# Patient Record
Sex: FEMALE | Race: WHITE | Employment: OTHER | URBAN - METROPOLITAN AREA
[De-identification: names, ages, dates, MRNs, and addresses within clinical notes are randomized per-mention and may not be internally consistent; named-entity substitution may affect disease eponyms.]

---

## 2018-03-05 PROBLEM — E87.20 LACTIC ACIDOSIS: Status: ACTIVE | Noted: 2018-03-05

## 2018-03-05 PROBLEM — D72.829 LEUKOCYTOSIS: Status: ACTIVE | Noted: 2018-03-05

## 2018-03-05 PROBLEM — R18.8 ASCITES: Status: ACTIVE | Noted: 2018-03-05

## 2018-03-05 PROBLEM — K63.89 MESENTERIC MASS: Status: ACTIVE | Noted: 2018-03-05

## 2018-03-09 PROBLEM — C56.2 OVARIAN CANCER, LEFT (HCC): Status: ACTIVE | Noted: 2018-03-09

## 2018-08-06 ENCOUNTER — OFFICE VISIT (OUTPATIENT)
Dept: FAMILY MEDICINE CLINIC | Age: 58
End: 2018-08-06

## 2018-08-06 VITALS
HEART RATE: 123 BPM | BODY MASS INDEX: 19.61 KG/M2 | TEMPERATURE: 99 F | DIASTOLIC BLOOD PRESSURE: 74 MMHG | SYSTOLIC BLOOD PRESSURE: 115 MMHG | RESPIRATION RATE: 18 BRPM | HEIGHT: 66 IN | OXYGEN SATURATION: 96 % | WEIGHT: 122 LBS

## 2018-08-06 DIAGNOSIS — C56.2 OVARIAN CANCER, LEFT (HCC): ICD-10-CM

## 2018-08-06 DIAGNOSIS — R00.0 TACHYCARDIA: ICD-10-CM

## 2018-08-06 DIAGNOSIS — F79 MENTAL RETARDATION: Primary | ICD-10-CM

## 2018-08-06 DIAGNOSIS — Z12.39 SCREENING FOR BREAST CANCER: ICD-10-CM

## 2018-08-06 PROBLEM — C80.1 CANCER (HCC): Status: ACTIVE | Noted: 2018-01-01

## 2018-08-06 RX ORDER — METOCLOPRAMIDE 10 MG/1
10 TABLET ORAL
COMMUNITY
End: 2020-09-24

## 2018-08-06 RX ORDER — DEXAMETHASONE 4 MG/1
TABLET ORAL EVERY 12 HOURS
COMMUNITY
End: 2019-10-30 | Stop reason: ALTCHOICE

## 2018-08-06 RX ORDER — ONDANSETRON HYDROCHLORIDE 8 MG/1
8 TABLET, FILM COATED ORAL
COMMUNITY
End: 2019-10-30 | Stop reason: ALTCHOICE

## 2018-08-06 RX ORDER — LORAZEPAM 1 MG/1
TABLET ORAL
Status: ON HOLD | COMMUNITY
End: 2021-07-04 | Stop reason: SDUPTHER

## 2018-08-06 NOTE — PROGRESS NOTES
[address]       Date of Service:  2018   Patient's Name: Landry So   Patient's :  1960     Subjective Hx:       No chief complaint on file. Landry So is a 62 y.o. female with a history of  has a past medical history of Cancer ([facility] [address] (2018) and Mental retardation. She present today with her her brother who is her care giver to establish care. Has a surgical history of Total abdominal Hysterectomy with bilateral Oophorectomy this past March due to stage III Ovarian Cancer. Started Chemotherapy in April and receiving it once a month. [facility] and bother was asked find a provider that she can see regularly. Will request records. Was recently approved for Medicaid and will be having 18 hours of week oif nursing care as her hygeine is very poor according to her brother. Also she will be going to Adult Day Care twice a week. Brother brought in a form for Physical to be filled. CareEverywhere updated. Hospital encounter notes, testing and discharge summary reviewed. PMHx and Problem list were reviewed and annotated with pertinent information. Medication list updated as below. Past Medical History:   Diagnosis Date    Cancer [facility]) 2018    ovarian    Mental retardation      Active Problem List:      Patient Active Problem List   Diagnosis Code    Lactic acidosis E87.2    Leukocytosis D72.829    Ascites R18.8    Mesenteric mass K63.9    Ovarian cancer, left (HCC) C56.2    Cancer (HCC) C80.1    Mental retardation F79     Medications:     Current Outpatient Prescriptions   Medication Sig    ondansetron hcl (ZOFRAN) 8 mg tablet Take 8 mg by mouth every eight (8) hours as needed for Nausea.  dexamethasone (DECADRON) 4 mg tablet Take  by mouth every twelve (12) hours.  metoclopramide HCl (REGLAN) 10 mg tablet Take 10 mg by mouth Before breakfast, lunch, dinner and at bedtime.     LORazepam (ATIVAN) 1 mg tablet Take  by mouth every four (4) hours as needed for Anxiety.  HYDROcodone-acetaminophen (NORCO) 5-325 mg per tablet Take 1 Tab by mouth every four (4) hours as needed. Max Daily Amount: 6 Tabs.  magnesium hydroxide (MILK OF MAGNESIA) 400 mg/5 mL suspension Take 30 mL by mouth as needed for Constipation.  enoxaparin (LOVENOX) 40 mg/0.4 mL 0.4 mL by SubCUTAneous route every twenty-four (24) hours. Indications: DEEP VEIN THROMBOSIS PREVENTION    diazePAM (VALIUM) 5 mg tablet Take 1 Tab by mouth every eight (8) hours as needed for Anxiety. Max Daily Amount: 15 mg. No current facility-administered medications for this visit.         Additional History     Past Surgical History:   Procedure Laterality Date    HX GYN  2018    hysterectomy    HX OTHER SURGICAL      REMOVAL OF MOLE     Social History     Social History    Marital status: SINGLE     Spouse name: N/A    Number of children: N/A    Years of education: N/A     Social History Main Topics    Smoking status: Never Smoker    Smokeless tobacco: Never Used    Alcohol use No    Drug use: No    Sexual activity: Not Asked     Other Topics Concern    None     Social History Narrative     Family History   Problem Relation Age of Onset    Cancer Mother     Cancer Father      No Known Allergies           Review of Systems (positives in bold)  General:   fevers, chills, generalized weakness, fatigue, malaise, weight change, night sweats, decreased appetite  Neurologic: dizziness, lightheadedness, headaches, loss of consciousness, numbness, tingling, focal weakness, speech change,confusion, memory loss, gait or balance difficulty     Neck:  pain, stiffness  Respiratory: dyspnea at rest, dyspnea on exertion, wheezing, cough, sputum production, pain with deep breaths/inspiration, hemoptysis  Cardiovasc:   chest pain, palpitations, orthopnea, PND, pedal edema  Gastrointest:  Intermittent nausea and abdominal pain,  Vomiting, constipation, diarrhea, heart burn, bitter taste in back of throat, bloody stools, tarry black stools    Urinary: dysuria, hematuria, urinary frequency, nocturia, malodorous urine, difficulty initiating flow, slow urine stream  Musculoskel:  joint pain, joint stiffness, joint swelling, trauma, back pain, neck pain, decreased range of motion, focal muscle pain, diffuse myalgias   Psychiatric: No agitation, confusion/disorientation, suicidal or homicidal ideation. Endocrine: polydipsia, polyuria, polyphagia, cold intolerance, heat intolerance  Hematologic: easy bruising, easy bleeding  Dermatologic: Itching, rashes    Physical Assessment:   VS:    Visit Vitals    /74 (BP 1 Location: Left arm, BP Patient Position: Sitting)    Pulse (!) 123    Temp 99 °F (37.2 °C) (Oral)    Resp 18    Ht 5' 6\" (1.676 m)    Wt 122 lb (55.3 kg)    SpO2 96%    BMI 19.69 kg/m2         General:   Well-groomed, well-nourished, in no distress, pleasant, alert, appropriate and conversant. Neck:     Neck supple, no swelling, mass or tenderness or thyromegaly; trachea midline. Cardiovasc: Tachycardia, No JVD. RRR,  no murmur, rubs or gallops. Pulmonary:   Normal respiratory effort. Lungs clear bilaterally, good air movement, no wheezing, rales or rhonchi. Abdomen:   Abdomen soft, normal bowel sounds. No masses, tenderness, rebound/rigidity or CVA tenderness. No hepatosplenomegaly. Extremities:  No redness, deformity, edema, tenderness on palpation,  LEs warm and well-perfused. Neuro:           Impaired gait. Skin:    No rashe or jaundice  MSK:   Normal full range of motion of joints, 5/5 muscle strength throughout. Psych:  Alert and oriented, no focal deficits. No facial asymmetry noted. No pressured speech or abnormal thought content   Psych:  Affect and behavior are normal and appropriate    Thought process demonstrated is linear and logical    Assessment/Plan:            ICD-10-CM ICD-9-CM    1. Mental retardation F79 319    2.  Ovarian cancer, left (HCC) C56.2 183.0    3. Tachycardia R00.0 785.0 LIPID PANEL      TSH 3RD GENERATION     Sent request for medical record release to [facility]. Awaiting record. The patient states understanding and agrees with the treatment plan. All questions were answered. Follow-up Disposition:  Return in about 4 weeks (around 9/3/2018) for heart rate.   JOHN Crowley  8/6/2018, 9:28 AM

## 2018-08-06 NOTE — PROGRESS NOTES
Rosie Head is a 62 y.o. female here this morning to establish care.  Her Oncologist, Dr. Duran Bergman, would like patient to have a mammogram.

## 2018-08-06 NOTE — PATIENT INSTRUCTIONS
Cardiac Arrhythmia: Care Instructions  Your Care Instructions    A cardiac arrhythmia is a change in the normal rhythm of the heart. Your heart may beat too fast or too slow or beat with an irregular or skipping rhythm. A change in the heart's rhythm may feel like a really strong heartbeat or a fluttering in your chest. A severe heart rhythm problem can keep the body from getting the blood it needs. This can result in shortness of breath, lightheadedness, and fainting. You may take medicine to treat your condition. Your doctor may recommend a pacemaker or recommend catheter ablation to destroy small parts of the heart that are causing a rhythm problem. Another possible treatment is an implantable cardioverter-defibrillator (ICD). An ICD is a device that gives the heart a shock to return the heart to a normal rhythm. Follow-up care is a key part of your treatment and safety. Be sure to make and go to all appointments, and call your doctor if you are having problems. It's also a good idea to know your test results and keep a list of the medicines you take. How can you care for yourself at home?  [facility]    · Be safe with medicines. Take your medicines exactly as prescribed. Call your doctor if you think you are having a problem with your medicine. You will get more details on the specific medicines your doctor prescribes.     · If you received a pacemaker or an ICD, you will get a fact sheet about it.     · Wear medical alert jewelry that says you have an abnormal heart rhythm. You can buy this at most drugstores.    Lifestyle changes    · Eat a heart-healthy diet.     · Stay at a healthy weight. Lose weight if you need to.     · Avoid nicotine, too much alcohol, and illegal drugs (meth, speed, and cocaine). Also, get enough sleep and do not overeat.     · Ask your doctor whether you can take over-the-counter medicines (such as decongestants).  These can make your heart beat fast.     · Talk to your doctor about any limits to activities, such as driving, or tasks where you use power tools or ladders. Activity    · Start light exercise if your doctor says you can. Even a small amount will help you get stronger, have more energy, and manage your stress.     · Get regular exercise. Try for 30 minutes on most days of the week. Ask your doctor what level of exercise is safe for you. If activity is not likely to cause health problems, you probably do not have limits on the type or level of activity that you can do. You may want to walk, swim, bike, or do other activities.     · When you exercise, watch for signs that your heart is working too hard. You are pushing too hard if you cannot talk while you exercise. If you become short of breath or dizzy or have chest pain, sit down and rest.     · Check your pulse daily. Place two fingers on the artery at the palm side of your wrist, in line with your thumb. If your heartbeat seems uneven, talk to your doctor. When should you call for help? Call 911 anytime you think you may need emergency care. For example, call if:    · You have symptoms of sudden heart failure. These may include:  ¨ Severe trouble breathing. ¨ A fast or irregular heartbeat. ¨ Coughing up pink, foamy mucus. ¨ You passed out.     · You have signs of a stroke. These include:  ¨ Sudden numbness, paralysis, or weakness in your face, arm, or leg, especially on only one side of your body. ¨ New problems with walking or balance. ¨ Sudden vision changes. ¨ Drooling or slurred speech. ¨ New problems speaking or understanding simple statements, or feeling confused. ¨ A sudden, severe headache that is different from past headaches.    Call your doctor now or seek immediate medical care if:    · You have new or changed symptoms of heart failure, such as:  ¨ New or increased shortness of breath. ¨ New or worse swelling in your legs, ankles, or feet.   ¨ Sudden weight gain, such as more than 2 to 3 pounds in a day or 5 pounds in a week. (Your doctor may suggest a different range of weight gain.)  ¨ Feeling dizzy or lightheaded or like you may faint. ¨ Feeling so tired or weak that you cannot do your usual activities. ¨ Not sleeping well. Shortness of breath wakes you at night. You need extra pillows to prop yourself up to breathe easier.    Watch closely for changes in your health, and be sure to contact your doctor if:    · You do not get better as expected. Where can you learn more? Go to http://jose-diana.info/. Enter L768 in the search box to learn more about \"Cardiac Arrhythmia: Care Instructions. \"  Current as of: December 6, 2017  Content Version: 11.7  © 1111-5737 [employer]. Care instructions adapted under license by [employer] (which disclaims liability or warranty for this information). If you have questions about a medical condition or this instruction, always ask your healthcare professional. [address] any warranty or liability for your use of this information.

## 2018-09-04 ENCOUNTER — OFFICE VISIT (OUTPATIENT)
Dept: FAMILY MEDICINE CLINIC | Age: 58
End: 2018-09-04

## 2018-09-04 VITALS
HEIGHT: 66 IN | SYSTOLIC BLOOD PRESSURE: 130 MMHG | RESPIRATION RATE: 18 BRPM | WEIGHT: 119 LBS | OXYGEN SATURATION: 97 % | HEART RATE: 126 BPM | DIASTOLIC BLOOD PRESSURE: 88 MMHG | TEMPERATURE: 98.2 F | BODY MASS INDEX: 19.13 KG/M2

## 2018-09-04 DIAGNOSIS — F79 MENTAL RETARDATION: ICD-10-CM

## 2018-09-04 DIAGNOSIS — Z11.1 ENCOUNTER FOR SCREENING FOR RESPIRATORY TUBERCULOSIS: ICD-10-CM

## 2018-09-04 DIAGNOSIS — R00.0 TACHYCARDIA: Primary | ICD-10-CM

## 2018-09-04 NOTE — PATIENT INSTRUCTIONS
Cardiac Arrhythmia: Care Instructions  Your Care Instructions    A cardiac arrhythmia is a change in the normal rhythm of the heart. Your heart may beat too fast or too slow or beat with an irregular or skipping rhythm. A change in the heart's rhythm may feel like a really strong heartbeat or a fluttering in your chest. A severe heart rhythm problem can keep the body from getting the blood it needs. This can result in shortness of breath, lightheadedness, and fainting. You may take medicine to treat your condition. Your doctor may recommend a pacemaker or recommend catheter ablation to destroy small parts of the heart that are causing a rhythm problem. Another possible treatment is an implantable cardioverter-defibrillator (ICD). An ICD is a device that gives the heart a shock to return the heart to a normal rhythm. Follow-up care is a key part of your treatment and safety. Be sure to make and go to all appointments, and call your doctor if you are having problems. It's also a good idea to know your test results and keep a list of the medicines you take. How can you care for yourself at home?  [facility]    · Be safe with medicines. Take your medicines exactly as prescribed. Call your doctor if you think you are having a problem with your medicine. You will get more details on the specific medicines your doctor prescribes.     · If you received a pacemaker or an ICD, you will get a fact sheet about it.     · Wear medical alert jewelry that says you have an abnormal heart rhythm. You can buy this at most drugstores.    Lifestyle changes    · Eat a heart-healthy diet.     · Stay at a healthy weight. Lose weight if you need to.     · Avoid nicotine, too much alcohol, and illegal drugs (meth, speed, and cocaine). Also, get enough sleep and do not overeat.     · Ask your doctor whether you can take over-the-counter medicines (such as decongestants).  These can make your heart beat fast.     · Talk to your doctor about any limits to activities, such as driving, or tasks where you use power tools or ladders. Activity    · Start light exercise if your doctor says you can. Even a small amount will help you get stronger, have more energy, and manage your stress.     · Get regular exercise. Try for 30 minutes on most days of the week. Ask your doctor what level of exercise is safe for you. If activity is not likely to cause health problems, you probably do not have limits on the type or level of activity that you can do. You may want to walk, swim, bike, or do other activities.     · When you exercise, watch for signs that your heart is working too hard. You are pushing too hard if you cannot talk while you exercise. If you become short of breath or dizzy or have chest pain, sit down and rest.     · Check your pulse daily. Place two fingers on the artery at the palm side of your wrist, in line with your thumb. If your heartbeat seems uneven, talk to your doctor. When should you call for help? Call 911 anytime you think you may need emergency care. For example, call if:    · You have symptoms of sudden heart failure. These may include:  ¨ Severe trouble breathing. ¨ A fast or irregular heartbeat. ¨ Coughing up pink, foamy mucus. ¨ You passed out.     · You have signs of a stroke. These include:  ¨ Sudden numbness, paralysis, or weakness in your face, arm, or leg, especially on only one side of your body. ¨ New problems with walking or balance. ¨ Sudden vision changes. ¨ Drooling or slurred speech. ¨ New problems speaking or understanding simple statements, or feeling confused. ¨ A sudden, severe headache that is different from past headaches.    Call your doctor now or seek immediate medical care if:    · You have new or changed symptoms of heart failure, such as:  ¨ New or increased shortness of breath. ¨ New or worse swelling in your legs, ankles, or feet.   ¨ Sudden weight gain, such as more than 2 to 3 pounds in a day or 5 pounds in a week. (Your doctor may suggest a different range of weight gain.)  ¨ Feeling dizzy or lightheaded or like you may faint. ¨ Feeling so tired or weak that you cannot do your usual activities. ¨ Not sleeping well. Shortness of breath wakes you at night. You need extra pillows to prop yourself up to breathe easier.    Watch closely for changes in your health, and be sure to contact your doctor if:    · You do not get better as expected. Where can you learn more? Go to http://jose-diana.info/. Enter L061 in the search box to learn more about \"Cardiac Arrhythmia: Care Instructions. \"  Current as of: December 6, 2017  Content Version: 11.7  © 4224-7385 [employer]. Care instructions adapted under license by [employer] (which disclaims liability or warranty for this information). If you have questions about a medical condition or this instruction, always ask your healthcare professional. [address] any warranty or liability for your use of this information.

## 2018-09-05 NOTE — PROGRESS NOTES
Progress Note  Today's Date:  2018   Patient:  Sofia Frias  Patient :  1960    Subjective:     Chief Complaint   Patient presents with   James Goff is a 62 y.o. female who presents for follow up for elevated heart rate. She was seen here a month ago and her heart rate was found to be elevated. Advised care giver who is her brother to check it at home, record and bring it at this visit which he did. Record shows heart rate mostly in th 90's with only the first two readings in the 100's and few in the 80's. Patient has been receiving to Chemotherapy with last dose recently according to brother. He will continue to monitor it at home. Brother states that she is usually anxious when she goes to [facility]. Patient denies chest pain. Current Outpatient Meds and Allergies     Current Outpatient Prescriptions on File Prior to Visit   Medication Sig Dispense Refill    ondansetron hcl (ZOFRAN) 8 mg tablet Take 8 mg by mouth every eight (8) hours as needed for Nausea.  dexamethasone (DECADRON) 4 mg tablet Take  by mouth every twelve (12) hours.  metoclopramide HCl (REGLAN) 10 mg tablet Take 10 mg by mouth Before breakfast, lunch, dinner and at bedtime.  LORazepam (ATIVAN) 1 mg tablet Take  by mouth every four (4) hours as needed for Anxiety. No current facility-administered medications on file prior to visit. These medications have been reviewed and reconciled with the patient during today's visit. No Known Allergies    ROS:     Pertinent positive as above in HPI. All others negative. Objective:     VS:    Visit Vitals    /88 (BP 1 Location: Left arm, BP Patient Position: Sitting)    Pulse (!) 126    Temp 98.2 °F (36.8 °C) (Oral)    Resp 18    Ht 5' 6\" (1.676 m)    Wt 119 lb (54 kg)    SpO2 97%    BMI 19.21 kg/m2       General:   Well-nourished, well-groomed, pleasant, alert, in no acute distress.      Cardiovasc: tachycardia, no murmurs, no rubs, no gallops,   Pulmonary:   Clear breath sounds bilaterally, good air movement, no wheezing, no rales, no rhonchi, normal respiratory effort  Abdomen:   Abdomen soft, nontender, nondistended, NABS,   Neuro:   Alert, minimally conversant, appropriate, following commands, no focal deficits. Psychiatric: Oriented to time, place and person. Normal mood, no agitation or anxiety. Normal affect. Pleasant and cooperative. Hospital Outpatient Visit on 08/27/2018   Component Date Value Ref Range Status    WBC 08/27/2018 10.0  4.0 - 11.0 1000/mm3 Final    RBC 08/27/2018 3.44* 3.60 - 5.20 M/uL Final    HGB 08/27/2018 11.2* 13.0 - 17.2 gm/dl Final    HCT 08/27/2018 34.6* 37.0 - 50.0 % Final    MCV 08/27/2018 100.6* 80.0 - 98.0 fL Final    MCH 08/27/2018 32.6  25.4 - 34.6 pg Final    MCHC 08/27/2018 32.4  30.0 - 36.0 gm/dl Final    PLATELET 88/64/8648 980  140 - 450 1000/mm3 Final    MPV 08/27/2018 9.8  6.0 - 10.0 fL Final    RDW-SD 08/27/2018 53.3* 36.4 - 46.3   Final    Sodium 08/27/2018 142  136 - 145 mEq/L Final    Potassium 08/27/2018 4.0  3.5 - 5.1 mEq/L Final    Chloride 08/27/2018 104  98 - 107 mEq/L Final    CO2 08/27/2018 30  21 - 32 mEq/L Final    Glucose 08/27/2018 136* 74 - 106 mg/dl Final    BUN 08/27/2018 22  7 - 25 mg/dl Final    Creatinine 08/27/2018 0.7  0.6 - 1.3 mg/dl Final    GFR est AA 08/27/2018 >60.0    Final    Comment: THE [facility] STATES THAT THE MDRD STUDY EQUATION SHOULD ONLY BE USED ON  INDIVIDUALS 18 OR OLDER. THE REPORT ALSO NOTES THAT THE MDRD STUDY EQUATION HAS NOT BEEN  VALIDATED FOR USE WITH THE ELDERLY (OVER 79YEARS OF AGE), PREGNANT WOMEN, PATIENTS WITH SERIOUS  COMORBID CONDITIONS, OR PERSONS WITH EXTREMES OF BODY SIZE, MUSCLE MASS, OR NUTRITIONAL STATUS. APPLICATION OF THE EQUATION TO THESE PATIENT GROUPS MAY LEAD TO ERRORS IN GFR ESTIMATION.  GFR  ESTIMATING EQUATIONS HAVE POORER AGREEMENT WITH MEASURED GFR FOR ILL HOSPITALIZED PATIENTS AND  FOR PEOPLE WITH NEAR NORMAL KIDNEY FUNCTION THAN FOR SUBJECTS IN THE MDRD STUDY. VALIDATION  STUDIES ARE IN PROGRESS TO EVALUATE THE MDRD STUDY EQUATION FOR ADDITIONAL ETHNIC GROUPS, THE  ELDERLY, VARIOUS DISEASE CONDITIONS, AND PEOPLE WITH NORMAL KIDNEY FUNCTION. GFRA----REFERS TO   GFRO---REFERS TO OTHER RACES  REFERENCES AVAILABLE UPON REQUEST.      GFR est non-AA 08/27/2018 >60    Final    Calcium 08/27/2018 9.6  8.5 - 10.1 mg/dl Final    AST (SGOT) 08/27/2018 14* 15 - 37 U/L Final    ALT (SGPT) 08/27/2018 24  12 - 78 U/L Final    Alk. phosphatase 08/27/2018 90  45 - 117 U/L Final    Bilirubin, total 08/27/2018 0.7  0.2 - 1.0 mg/dl Final    Protein, total 08/27/2018 7.4  6.4 - 8.2 gm/dl Final    Albumin 08/27/2018 3.9  3.4 - 5.0 gm/dl Final    Anion gap 08/27/2018 8  5 - 15 mmol/L Final    Cancer Ag (CA) 125 08/27/2018 6  <35 U/mL Final    Comment: This test was performed using the [employer]  Chemiluminescent method. Values obtained from  different assay methods cannot be used interchangeably.  levels, regardless of value, should not be  interpreted as absolute evidence of the presence or  absence of disease.   Test Performed by Myles Talamantes, [address] M.D., Ph.D., Director of Laboratories  [phone], [address] Outpatient Visit on 08/20/2018   Component Date Value Ref Range Status    WBC 08/20/2018 8.2  4.0 - 11.0 1000/mm3 Final    RBC 08/20/2018 3.37* 3.60 - 5.20 M/uL Final    HGB 08/20/2018 10.9* 13.0 - 17.2 gm/dl Final    HCT 08/20/2018 33.4* 37.0 - 50.0 % Final    MCV 08/20/2018 99.1* 80.0 - 98.0 fL Final    MCH 08/20/2018 32.3  25.4 - 34.6 pg Final    MCHC 08/20/2018 32.6  30.0 - 36.0 gm/dl Final    PLATELET 34/50/9047 326  140 - 450 1000/mm3 Final    MPV 08/20/2018 9.8  6.0 - 10.0 fL Final    RDW-SD 08/20/2018 52.3* 36.4 - 46.3   Final    NRBC 08/20/2018 0  0 - 0   Final    IMMATURE GRANULOCYTES 08/20/2018 1.0  0.0 - 3.0 % Final    Comment: IG - Immature granulocytes (promyelocytes + myelocytes + metamyelocytes), their presence  indicates a left shift. An IG > 3% may predict positive blood cultures with 98% specificity.  (P<0.04) and 92% Positive Predictive Value (Lana)1. Increased immature granulocytes  assist with the detection of infection and/or inflammation and may be present at an early stage  and are more sensitive and specific than band counts.  NEUTROPHILS 08/20/2018 68.0* 34 - 64 % Final    LYMPHOCYTES 08/20/2018 19.7* 28 - 48 % Final    MONOCYTES 08/20/2018 9.5  1 - 13 % Final    EOSINOPHILS 08/20/2018 1.1  0 - 5 % Final    BASOPHILS 08/20/2018 0.7  0 - 3 % Final   Hospital Outpatient Visit on 08/13/2018   Component Date Value Ref Range Status    WBC 08/13/2018 13.2* 4.0 - 11.0 1000/mm3 Final    NEUTROPHILS 08/13/2018 85.4* 34 - 64 % Final    LYMPHOCYTES 08/13/2018 9.7* 28 - 48 % Final    RBC 08/13/2018 3.49* 3.60 - 5.20 M/uL Final    MONOCYTES 08/13/2018 4.9  1 - 13 % Final    HGB 08/13/2018 11.2* 13.0 - 17.2 gm/dl Final    HCT 08/13/2018 34.4* 37.0 - 50.0 % Final    MCV 08/13/2018 98.6* 80.0 - 98.0 fL Final    MCH 08/13/2018 32.1  25.4 - 34.6 pg Final    MCHC 08/13/2018 32.6  30.0 - 36.0 gm/dl Final    PLATELET 37/14/5547 201  140 - 450 1000/mm3 Final    MPV 08/13/2018 10.6* 6.0 - 10.0 fL Final    RDW-SD 08/13/2018 52.7* 36.4 - 46.3   Final    NRBC 08/13/2018 0  0 - 0   Final    IMMATURE GRANULOCYTES 08/13/2018 7.0* 0.0 - 3.0 % Final    Comment: IG - Immature granulocytes (promyelocytes + myelocytes + metamyelocytes), their presence  indicates a left shift. An IG > 3% may predict positive blood cultures with 98% specificity.  (P<0.04) and 92% Positive Predictive Value (Lana)1.  Increased immature granulocytes  assist with the detection of infection and/or inflammation and may be present at an early stage  and are more sensitive and specific than band counts.  EOSINOPHILS 08/13/2018 0.5  0 - 5 % Final    BASOPHILS 08/13/2018 0.4  0 - 3 % Final    Smudge cells 08/13/2018 9.7    Final    Giant platelets 98/87/2877 2.9    Final   Hospital Outpatient Visit on 08/06/2018   Component Date Value Ref Range Status    WBC 08/06/2018 53.1* 4.0 - 11.0 1000/mm3 Final    RBC 08/06/2018 3.93  3.60 - 5.20 M/uL Final    HGB 08/06/2018 12.7* 13.0 - 17.2 gm/dl Final    HCT 08/06/2018 39.0  37.0 - 50.0 % Final    MCV 08/06/2018 99.2* 80.0 - 98.0 fL Final    BAND NEUTROPHILS 08/06/2018 1.9  0 - 11 % Final    Comment: ** ** ** ** **  CORRECTED REPORT ** ** ** ** **  The previous value of no value was corrected by [id number] on 08/06/18 22:51.      MCH 08/06/2018 32.3  25.4 - 34.6 pg Final    MCHC 08/06/2018 32.6  30.0 - 36.0 gm/dl Final    PLATELET 63/38/6344 608  140 - 450 1000/mm3 Final    MPV 08/06/2018 10.8* 6.0 - 10.0 fL Final    RDW-SD 08/06/2018 57.7* 36.4 - 46.3   Final    Poikilocytosis 08/06/2018 2    Final    Comment: ** ** ** ** **  CORRECTED REPORT ** ** ** ** **  The previous value of no value was corrected by [id number] on 08/06/18 22:51.      NRBC 08/06/2018 0  0 - 0   Final    Anisocytosis 08/06/2018 0    Final    Comment: ** ** ** ** **  CORRECTED REPORT ** ** ** ** **  The previous value of no value was corrected by [id number] on 08/06/18 22:51.      IMMATURE GRANULOCYTES 08/06/2018 17.2* 0.0 - 3.0 % Final    Comment: IG - Immature granulocytes (promyelocytes + myelocytes + metamyelocytes), their presence  indicates a left shift. An IG > 3% may predict positive blood cultures with 98% specificity.  (P<0.04) and 92% Positive Predictive Value (Lana)1. Increased immature granulocytes  assist with the detection of infection and/or inflammation and may be present at an early stage  and are more sensitive and specific than band counts.       HYPOCHROMASIA 08/06/2018 0    Final    Comment: ** ** ** ** **  CORRECTED REPORT ** ** ** ** **  The previous value of no value was corrected by [id number] on 08/06/18 22:51.  Polychromasia 08/06/2018 0    Final    Comment: ** ** ** ** **  CORRECTED REPORT ** ** ** ** **  The previous value of no value was corrected by [id number] on 08/06/18 22:51.  Microcytes 08/06/2018 0    Final    Comment: ** ** ** ** **  CORRECTED REPORT ** ** ** ** **  The previous value of no value was corrected by [id number] on 08/06/18 22:51.  Macrocytes 08/06/2018 0    Final    Comment: ** ** ** ** **  CORRECTED REPORT ** ** ** ** **  The previous value of no value was corrected by [id number] on 08/06/18 22:51.      NEUTROPHILS 08/06/2018 92.5* 34 - 64 % Corrected    Comment: ** ** ** ** **  CORRECTED REPORT ** ** ** ** **  The previous value of 79.1 was corrected by [id number] on 08/06/18 22:59.      LYMPHOCYTES 08/06/2018 2.8* 28 - 48 % Corrected    Comment: ** ** ** ** **  CORRECTED REPORT ** ** ** ** **  The previous value of 2.9 was corrected by [id number] on 08/06/18 22:59.      MONOCYTES 08/06/2018 2.8  1 - 13 % Corrected    Comment: ** ** ** ** **  CORRECTED REPORT ** ** ** ** **  The previous value of 0.7 was corrected by [id number] on 08/06/18 22:59.      EOSINOPHILS 08/06/2018 0.0  0 - 5 % Final    BASOPHILS 08/06/2018 0.0  0 - 3 % Corrected    Comment: ** ** ** ** **  CORRECTED REPORT ** ** ** ** **  The previous value of 0.1 was corrected by [id number] on 08/06/18 22:59.       Sodium 08/06/2018 136  136 - 145 mEq/L Final    Potassium 08/06/2018 4.1  3.5 - 5.1 mEq/L Final    Chloride 08/06/2018 99  98 - 107 mEq/L Final    CO2 08/06/2018 28  21 - 32 mEq/L Final    Glucose 08/06/2018 73* 74 - 106 mg/dl Final    BUN 08/06/2018 18  7 - 25 mg/dl Final    Creatinine 08/06/2018 0.6  0.6 - 1.3 mg/dl Final    GFR est AA 08/06/2018 >60.0    Final    Comment: THE [facility] STATES THAT THE MDRD STUDY EQUATION SHOULD ONLY BE USED ON  INDIVIDUALS 18 OR OLDER. THE REPORT ALSO NOTES THAT THE MDRD STUDY EQUATION HAS NOT BEEN  VALIDATED FOR USE WITH THE ELDERLY (OVER 79YEARS OF AGE), PREGNANT WOMEN, PATIENTS WITH SERIOUS  COMORBID CONDITIONS, OR PERSONS WITH EXTREMES OF BODY SIZE, MUSCLE MASS, OR NUTRITIONAL STATUS. APPLICATION OF THE EQUATION TO THESE PATIENT GROUPS MAY LEAD TO ERRORS IN GFR ESTIMATION. GFR  ESTIMATING EQUATIONS HAVE POORER AGREEMENT WITH MEASURED GFR FOR ILL HOSPITALIZED PATIENTS AND  FOR PEOPLE WITH NEAR NORMAL KIDNEY FUNCTION THAN FOR SUBJECTS IN THE MDRD STUDY. VALIDATION  STUDIES ARE IN PROGRESS TO EVALUATE THE MDRD STUDY EQUATION FOR ADDITIONAL ETHNIC GROUPS, THE  ELDERLY, VARIOUS DISEASE CONDITIONS, AND PEOPLE WITH NORMAL KIDNEY FUNCTION. GFRA----REFERS TO   GFRO---REFERS TO OTHER RACES  REFERENCES AVAILABLE UPON REQUEST.      GFR est non-AA 08/06/2018 >60    Final    Calcium 08/06/2018 9.1  8.5 - 10.1 mg/dl Final    AST (SGOT) 08/06/2018 19  15 - 37 U/L Final    ALT (SGPT) 08/06/2018 28  12 - 78 U/L Final    Alk. phosphatase 08/06/2018 109  45 - 117 U/L Final    Bilirubin, total 08/06/2018 0.8  0.2 - 1.0 mg/dl Final    Protein, total 08/06/2018 7.6  6.4 - 8.2 gm/dl Final    Albumin 08/06/2018 4.1  3.4 - 5.0 gm/dl Final    Anion gap 08/06/2018 9  5 - 15 mmol/L Final    Cancer Ag (CA) 125 08/06/2018 6  <35 U/mL Final    Comment: This test was performed using the [employer]  Chemiluminescent method. Values obtained from  different assay methods cannot be used interchangeably.  levels, regardless of value, should not be  interpreted as absolute evidence of the presence or  absence of disease.   Test Performed by Indira Huntley, [address] M.D., Ph.D., Director of Laboratories  [phone], CLIA [id number]      Cholesterol, total 08/06/2018 131* 140 - 199 mg/dl Final    HDL Cholesterol 08/06/2018 41  40 - 96 mg/dl Final    Triglyceride 08/06/2018 243* 29 - 150 mg/dl Final    LDL, calculated 08/06/2018 41  0 - 130 mg/dl Final    Comment: TARGET/DECISION VALUES DEPEND ON A NUMBER OF RISK FACTORS. LDL CALCULATION NOT VALID IF TRIGLYCERIDES ARE GREATER THAN 400 mg/dL.  CHOL/HDL Ratio 08/06/2018 3.2  0.0 - 4.4 Ratio Final    TSH 08/06/2018 4.820* 0.358 - 3.740 uIU/mL Final   Hospital Outpatient Visit on 07/30/2018   Component Date Value Ref Range Status    WBC 07/30/2018 7.5  4.0 - 11.0 1000/mm3 Final    RBC 07/30/2018 3.72  3.60 - 5.20 M/uL Final    HGB 07/30/2018 11.7* 13.0 - 17.2 gm/dl Final    HCT 07/30/2018 36.1* 37.0 - 50.0 % Final    MCV 07/30/2018 97.0  80.0 - 98.0 fL Final    MCH 07/30/2018 31.5  25.4 - 34.6 pg Final    MCHC 07/30/2018 32.4  30.0 - 36.0 gm/dl Final    PLATELET 23/46/6295 854* 140 - 450 1000/mm3 Final    MPV 07/30/2018 9.8  6.0 - 10.0 fL Final    RDW-SD 07/30/2018 60.6* 36.4 - 46.3   Final    NRBC 07/30/2018 0  0 - 0   Final    IMMATURE GRANULOCYTES 07/30/2018 0.3  0.0 - 3.0 % Final    Comment: IG - Immature granulocytes (promyelocytes + myelocytes + metamyelocytes), their presence  indicates a left shift. An IG > 3% may predict positive blood cultures with 98% specificity.  (P<0.04) and 92% Positive Predictive Value ([place])1. Increased immature granulocytes  assist with the detection of infection and/or inflammation and may be present at an early stage  and are more sensitive and specific than band counts.       NEUTROPHILS 07/30/2018 63.3  34 - 64 % Final    LYMPHOCYTES 07/30/2018 23.5* 28 - 48 % Final    MONOCYTES 07/30/2018 10.1  1 - 13 % Final    EOSINOPHILS 07/30/2018 2.1  0 - 5 % Final    BASOPHILS 07/30/2018 0.7  0 - 3 % Final    Sodium 07/30/2018 140  136 - 145 mEq/L Final    Potassium 07/30/2018 3.9  3.5 - 5.1 mEq/L Final    Chloride 07/30/2018 105  98 - 107 mEq/L Final    CO2 07/30/2018 30  21 - 32 mEq/L Final    Glucose 07/30/2018 89  74 - 106 mg/dl Final    BUN 07/30/2018 22  7 - 25 mg/dl Final    Creatinine 07/30/2018 0.5* 0.6 - 1.3 mg/dl Final    GFR est AA 07/30/2018 >60.0    Final    Comment: THE [facility] GROUP STATES THAT THE MDRD STUDY EQUATION SHOULD ONLY BE USED ON  INDIVIDUALS 18 OR OLDER. THE REPORT ALSO NOTES THAT THE MDRD STUDY EQUATION HAS NOT BEEN  VALIDATED FOR USE WITH THE ELDERLY (OVER 79YEARS OF AGE), PREGNANT WOMEN, PATIENTS WITH SERIOUS  COMORBID CONDITIONS, OR PERSONS WITH EXTREMES OF BODY SIZE, MUSCLE MASS, OR NUTRITIONAL STATUS. APPLICATION OF THE EQUATION TO THESE PATIENT GROUPS MAY LEAD TO ERRORS IN GFR ESTIMATION. GFR  ESTIMATING EQUATIONS HAVE POORER AGREEMENT WITH MEASURED GFR FOR ILL HOSPITALIZED PATIENTS AND  FOR PEOPLE WITH NEAR NORMAL KIDNEY FUNCTION THAN FOR SUBJECTS IN THE MDRD STUDY. VALIDATION  STUDIES ARE IN PROGRESS TO EVALUATE THE MDRD STUDY EQUATION FOR ADDITIONAL ETHNIC GROUPS, THE  ELDERLY, VARIOUS DISEASE CONDITIONS, AND PEOPLE WITH NORMAL KIDNEY FUNCTION. GFRA----REFERS TO   GFRO---REFERS TO OTHER RACES  REFERENCES AVAILABLE UPON REQUEST.      GFR est non-AA 07/30/2018 >60    Final    Calcium 07/30/2018 9.4  8.5 - 10.1 mg/dl Final    AST (SGOT) 07/30/2018 11* 15 - 37 U/L Final    ALT (SGPT) 07/30/2018 23  12 - 78 U/L Final    Alk. phosphatase 07/30/2018 89  45 - 117 U/L Final    Bilirubin, total 07/30/2018 0.4  0.2 - 1.0 mg/dl Final    Protein, total 07/30/2018 7.6  6.4 - 8.2 gm/dl Final    Albumin 07/30/2018 4.1  3.4 - 5.0 gm/dl Final    Anion gap 07/30/2018 5  5 - 15 mmol/L Final    Cancer Ag (CA) 125 07/30/2018 6  <35 U/mL Final    Comment: This test was performed using the [employer]  Chemiluminescent method. Values obtained from  different assay methods cannot be used interchangeably.  levels, regardless of value, should not be  interpreted as absolute evidence of the presence or  absence of disease.   Test Performed by Sushma Raines [place] [id number],  [facility] [address] [place] [address] Haleigh Musa M.D., Ph.D., Director of Laboratories  [phone], [address] Outpatient Visit on 07/23/2018   Component Date Value Ref Range Status    WBC 07/23/2018 8.3  4.0 - 11.0 1000/mm3 Final    RBC 07/23/2018 3.53* 3.60 - 5.20 M/uL Final    HGB 07/23/2018 11.1* 13.0 - 17.2 gm/dl Final    HCT 07/23/2018 33.4* 37.0 - 50.0 % Final    MCV 07/23/2018 94.6  80.0 - 98.0 fL Final    MCH 07/23/2018 31.4  25.4 - 34.6 pg Final    MCHC 07/23/2018 33.2  30.0 - 36.0 gm/dl Final    PLATELET 19/40/5520 177  140 - 450 1000/mm3 Final    MPV 07/23/2018 9.8  6.0 - 10.0 fL Final    RDW-SD 07/23/2018 59.9* 36.4 - 46.3   Final    NRBC 07/23/2018 0  0 - 0   Final    IMMATURE GRANULOCYTES 07/23/2018 0.6  0.0 - 3.0 % Final    Comment: IG - Immature granulocytes (promyelocytes + myelocytes + metamyelocytes), their presence  indicates a left shift. An IG > 3% may predict positive blood cultures with 98% specificity.  (P<0.04) and 92% Positive Predictive Value ([place])1. Increased immature granulocytes  assist with the detection of infection and/or inflammation and may be present at an early stage  and are more sensitive and specific than band counts.       NEUTROPHILS 07/23/2018 66.3* 34 - 64 % Final    LYMPHOCYTES 07/23/2018 23.3* 28 - 48 % Final    MONOCYTES 07/23/2018 8.2  1 - 13 % Final    EOSINOPHILS 07/23/2018 1.1  0 - 5 % Final    BASOPHILS 07/23/2018 0.5  0 - 3 % Final   Hospital Outpatient Visit on 07/16/2018   Component Date Value Ref Range Status    WBC 07/16/2018 10.2  4.0 - 11.0 1000/mm3 Final    RBC 07/16/2018 3.58* 3.60 - 5.20 M/uL Final    HGB 07/16/2018 11.0* 13.0 - 17.2 gm/dl Final    HCT 07/16/2018 33.9* 37.0 - 50.0 % Final    MCV 07/16/2018 94.7  80.0 - 98.0 fL Final    BAND NEUTROPHILS 07/16/2018 2.9  0 - 11 % Final    Comment: ** ** ** ** **  CORRECTED REPORT ** ** ** ** **  The previous value of no value was corrected by [id number] on 07/16/18 18:52.      MCH 07/16/2018 30.7  25.4 - 34.6 pg Final    MCHC 07/16/2018 32.4  30.0 - 36.0 gm/dl Final    PLATELET 72/06/5942 012  140 - 450 1000/mm3 Final    MPV 07/16/2018 10.9* 6.0 - 10.0 fL Final    RDW-SD 07/16/2018 55.8* 36.4 - 46.3   Final    NRBC 07/16/2018 0  0 - 0   Final    IMMATURE GRANULOCYTES 07/16/2018 0.9  0.0 - 3.0 % Final    Comment: IG - Immature granulocytes (promyelocytes + myelocytes + metamyelocytes), their presence  indicates a left shift. An IG > 3% may predict positive blood cultures with 98% specificity.  (P<0.04) and 92% Positive Predictive Value (Lana)1. Increased immature granulocytes  assist with the detection of infection and/or inflammation and may be present at an early stage  and are more sensitive and specific than band counts.  NEUTROPHILS 07/16/2018 71.5* 34 - 64 % Final    LYMPHOCYTES 07/16/2018 19.5* 28 - 48 % Final    MONOCYTES 07/16/2018 6.3  1 - 13 % Final    EOSINOPHILS 07/16/2018 1.1  0 - 5 % Final    BASOPHILS 07/16/2018 0.7  0 - 3 % Final    Smudge cells 07/16/2018 4.9    Final    Comment: ** ** ** ** **  CORRECTED REPORT ** ** ** ** **  The previous value of no value was corrected by [id number] on 07/16/18 18:52.  PLATELET COMMENTS 87/48/9403 NORMAL    Final    Comment: ** ** ** ** **  CORRECTED REPORT ** ** ** ** **  The previous value of no value was corrected by [id number] on 07/16/18 18:54.      Hospital Outpatient Visit on 07/09/2018   Component Date Value Ref Range Status    WBC 07/09/2018 42.5* 4.0 - 11.0 1000/mm3 Final    NEUTROPHILS 07/09/2018 91.3* 34 - 64 % Final    LYMPHOCYTES 07/09/2018 1.0* 28 - 48 % Final    RBC 07/09/2018 4.16  3.60 - 5.20 M/uL Final    MONOCYTES 07/09/2018 2.9  1 - 13 % Final    HGB 07/09/2018 12.6* 13.0 - 17.2 gm/dl Final    HCT 07/09/2018 38.5  37.0 - 50.0 % Final    MCV 07/09/2018 92.5  80.0 - 98.0 fL Final    BAND NEUTROPHILS 07/09/2018 4.8  0 - 11 % Final    MCH 07/09/2018 30.3  25.4 - 34.6 pg Final    MCHC 07/09/2018 32.7  30.0 - 36.0 gm/dl Final    PLATELET 16/69/6034 475  140 - 450 1000/mm3 Final    MPV 07/09/2018 11.4* 6.0 - 10.0 fL Final    RDW-SD 07/09/2018 55.4* 36.4 - 46.3   Final    Poikilocytosis 07/09/2018 0    Final    NRBC 07/09/2018 0  0 - 0   Final    Anisocytosis 07/09/2018 0    Final    IMMATURE GRANULOCYTES 07/09/2018 10.4* 0.0 - 3.0 % Final    Comment: IG - Immature granulocytes (promyelocytes + myelocytes + metamyelocytes), their presence  indicates a left shift. An IG > 3% may predict positive blood cultures with 98% specificity.  (P<0.04) and 92% Positive Predictive Value ([place])1. Increased immature granulocytes  assist with the detection of infection and/or inflammation and may be present at an early stage  and are more sensitive and specific than band counts.       HYPOCHROMASIA 07/09/2018 0    Final    Polychromasia 07/09/2018 0    Final    Microcytes 07/09/2018 0    Final    Macrocytes 07/09/2018 0    Final    Codocytes 07/09/2018 0    Final    Schistocytes 07/09/2018 0    Final    Dacrocytes (Teardrop cells) 07/09/2018 0    Final    Elliptocytes 07/09/2018 0    Final    Basophilic stippling 68/43/1234 0    Final    SPHEROCYTES 07/09/2018 0    Final    Smudge cells 07/09/2018 1.0    Final    Rouleaux 07/09/2018 0    Final    Stomatocytes 07/09/2018 0    Final    Giant platelets 01/81/8622 1.0    Final    RBC Morphology 07/09/2018 NORMAL    Final    Acanthrocytes 07/09/2018 0    Final    ECHINOCYTES 07/09/2018 0    Final    Crenated RBCs 07/09/2018 0    Final    PAPPENHEIMER BODIES 07/09/2018 0    Final    Ovalocytes-DIF 07/09/2018 0    Final    Drepanocytes 07/09/2018 0    Final    Helmet cells 07/09/2018 0    Final    PLATELET COMMENTS 31/44/7311 NORMAL    Final    Manning-Jolly body 07/09/2018 0    Final   Hospital Outpatient Visit on 06/26/2018   Component Date Value Ref Range Status    WBC 06/26/2018 8.2  4.0 - 11.0 1000/mm3 Final    RBC 06/26/2018 4.10  3.60 - 5.20 M/uL Final    HGB 06/26/2018 12.4* 13.0 - 17.2 gm/dl Final    HCT 06/26/2018 38.1  37.0 - 50.0 % Final    MCV 06/26/2018 92.9  80.0 - 98.0 fL Final    MCH 06/26/2018 30.2  25.4 - 34.6 pg Final    MCHC 06/26/2018 32.5  30.0 - 36.0 gm/dl Final    PLATELET 16/57/3203 76* 140 - 450 1000/mm3 Final    MPV 06/26/2018 11.8* 6.0 - 10.0 fL Final    RDW-SD 06/26/2018 50.9* 36.4 - 46.3   Final    Potassium 06/26/2018 4.0  3.5 - 5.1 mEq/L Final    Chloride 06/26/2018 107  98 - 107 mEq/L Final    Sodium 06/26/2018 141  136 - 145 mEq/L Final    CO2 06/26/2018 24  21 - 32 mEq/L Final    Glucose 06/26/2018 81  60 - 100 mg/dl Final    BUN 06/26/2018 18  7 - 25 mg/dl Final    Creatinine 06/26/2018 0.6  0.6 - 1.3 mg/dl Final    GFR est AA 06/26/2018 >60.0    Final    Comment: THE [facility] STATES THAT THE MDRD STUDY EQUATION SHOULD ONLY BE USED ON  INDIVIDUALS 18 OR OLDER. THE REPORT ALSO NOTES THAT THE MDRD STUDY EQUATION HAS NOT BEEN  VALIDATED FOR USE WITH THE ELDERLY (OVER 79YEARS OF AGE), PREGNANT WOMEN, PATIENTS WITH SERIOUS  COMORBID CONDITIONS, OR PERSONS WITH EXTREMES OF BODY SIZE, MUSCLE MASS, OR NUTRITIONAL STATUS. APPLICATION OF THE EQUATION TO THESE PATIENT GROUPS MAY LEAD TO ERRORS IN GFR ESTIMATION. GFR  ESTIMATING EQUATIONS HAVE POORER AGREEMENT WITH MEASURED GFR FOR ILL HOSPITALIZED PATIENTS AND  FOR PEOPLE WITH NEAR NORMAL KIDNEY FUNCTION THAN FOR SUBJECTS IN THE MDRD STUDY. VALIDATION  STUDIES ARE IN PROGRESS TO EVALUATE THE MDRD STUDY EQUATION FOR ADDITIONAL ETHNIC GROUPS, THE  ELDERLY, VARIOUS DISEASE CONDITIONS, AND PEOPLE WITH NORMAL KIDNEY FUNCTION.   GFRA----REFERS TO   GFRO---REFERS TO OTHER RACES  REFERENCES AVAILABLE UPON REQUEST.      GFR est non-AA 06/26/2018 >60    Final    Calcium 06/26/2018 9.6  8.5 - 10.1 mg/dl Final    AST (SGOT) 06/26/2018 13* 15 - 37 U/L Final    ALT (SGPT) 06/26/2018 21  12 - 78 U/L Final    Alk. phosphatase 06/26/2018 97  45 - 117 U/L Final    Bilirubin, total 06/26/2018 0.2  0.2 - 1.0 mg/dl Final    Protein, total 06/26/2018 7.7  6.4 - 8.2 gm/dl Final    Albumin 06/26/2018 3.8  3.4 - 5.0 gm/dl Final    Anion gap 06/26/2018 10  5 - 15 mmol/L Final    Cancer Ag (CA) 125 06/26/2018 7  <35 U/mL Final    Comment: This test was performed using the Vernon Reyes  Chemiluminescent method. Values obtained from  different assay methods cannot be used interchangeably.  levels, regardless of value, should not be  interpreted as absolute evidence of the presence or  absence of disease. Test Performed by Yesenia Heck, [address] M.D., Ph.D., Director of Laboratories  [phone], [address] Outpatient Visit on 06/18/2018   Component Date Value Ref Range Status    WBC 06/18/2018 22.0* 4.0 - 11.0 1000/mm3 Final    RBC 06/18/2018 3.76  3.60 - 5.20 M/uL Final    HGB 06/18/2018 11.3* 13.0 - 17.2 gm/dl Final    HCT 06/18/2018 34.2* 37.0 - 50.0 % Final    MCV 06/18/2018 91.0  80.0 - 98.0 fL Final    MCH 06/18/2018 30.1  25.4 - 34.6 pg Final    MCHC 06/18/2018 33.0  30.0 - 36.0 gm/dl Final    PLATELET 56/60/9478 396  140 - 450 1000/mm3 Final    MPV 06/18/2018 10.6* 6.0 - 10.0 fL Final    RDW-SD 06/18/2018 48.0* 36.4 - 46.3   Final    IMMATURE GRANULOCYTES 06/18/2018 10.8* 0.0 - 3.0 % Final    Comment: IG - Immature granulocytes (promyelocytes + myelocytes + metamyelocytes), their presence  indicates a left shift. An IG > 3% may predict positive blood cultures with 98% specificity.  (P<0.04) and 92% Positive Predictive Value ([place])1.  Increased immature granulocytes  assist with the detection of infection and/or inflammation and may be present at an early stage  and are more sensitive and specific than band counts.  NEUTROPHILS 06/18/2018 68.0* 34 - 64 % Final    LYMPHOCYTES 06/18/2018 22.0* 28 - 48 % Final    MONOCYTES 06/18/2018 2.0  1 - 13 % Final    BAND NEUTROPHILS 06/18/2018 6.0  0 - 11 % Final    METAMYELOCYTES 06/18/2018 1.0* 0 - 0 % Final    MYELOCYTES 06/18/2018 1.0* 0 - 0 % Final    Anisocytosis 06/18/2018 1+    Final    PLATELET COMMENTS 79/82/4431 NORMAL    Final   There may be more visits with results that are not included. Assessment/Plan:       1. Tachycardia    2. Encounter for screening for respiratory tuberculosis  - AMB POC TUBERCULOSIS, INTRADERMAL (SKIN TEST)    3. Mental retardation    Physical Form was filled but waiting for result of PPD to give to patient's brother. I have discussed the diagnosis with the patient and the intended plan as seen in the above orders. The patient has received an after-visit summary along with patient information handout. I have discussed medication side effects and warnings with the patient as well. Pt verbalized understanding. Follow-up Disposition:  Return in about 3 months (around 12/4/2018) for Tachycardia.   JOHN Crowley  9/5/2018, 9:43 AM

## 2018-09-06 ENCOUNTER — CLINICAL SUPPORT (OUTPATIENT)
Dept: FAMILY MEDICINE CLINIC | Age: 58
End: 2018-09-06

## 2018-09-06 DIAGNOSIS — Z11.1 ENCOUNTER FOR SCREENING FOR RESPIRATORY TUBERCULOSIS: Primary | ICD-10-CM

## 2018-09-06 LAB
MM INDURATION POC: 0 MM (ref 0–5)
PPD POC: NEGATIVE NEGATIVE

## 2018-09-06 NOTE — PROGRESS NOTES
Chief Complaint   Patient presents with    PPD Reading     negative     PPD Reading Note  PPD read and results entered in [employer]. Result: 0 mm induration.   Interpretation: NEGATIVE  Allergic reaction: no

## 2018-10-16 ENCOUNTER — OFFICE VISIT (OUTPATIENT)
Dept: FAMILY MEDICINE CLINIC | Age: 58
End: 2018-10-16

## 2018-10-16 VITALS
SYSTOLIC BLOOD PRESSURE: 115 MMHG | TEMPERATURE: 97.5 F | OXYGEN SATURATION: 96 % | RESPIRATION RATE: 18 BRPM | BODY MASS INDEX: 19.44 KG/M2 | HEIGHT: 66 IN | HEART RATE: 98 BPM | WEIGHT: 121 LBS | DIASTOLIC BLOOD PRESSURE: 83 MMHG

## 2018-10-16 DIAGNOSIS — R10.31 RIGHT GROIN PAIN: ICD-10-CM
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
## 2018-10-16 DIAGNOSIS — M25.551 RIGHT HIP PAIN: Primary | ICD-10-CM

## 2018-10-16 NOTE — PROGRESS NOTES
Patient: Shaneka Spann  Date of Service: 10/16/2018   Provider: JOHN Wier     REASON FOR VISIT:   Chief Complaint   Patient presents with    Hip Pain        HISTORY OF PRESENT ILLNESS:   Shaneka Spann is a 62 y.o. female who presents to the office for acute care. She present with her brother who states that she fell twice this past Saturday. First time she slipped on the hardwood floor and lander on her buttock; she had only one sock on. The second time she was going up the stairs, had many things in her arms on her back with her feet twisted behind her. She got up and was limping and has been since then. Patient states that she has pain in her inner thigh when she walk. She took OTC Ibuprofen the first couple days. Denies bladder and bowel incontinence. 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  dexamethasone (DECADRON) 4 mg tablet Take  by mouth every twelve (12) hours.  metoclopramide HCl (REGLAN) 10 mg tablet Take 10 mg by mouth Before breakfast, lunch, dinner and at bedtime.  LORazepam (ATIVAN) 1 mg tablet Take  by mouth every four (4) hours as needed for Anxiety. ROS:   Pertinent positive as above in HPI. All others negative. PHYSICAL EXAMINATION:  Visit Vitals    /83 (BP 1 Location: Left arm, BP Patient Position: Sitting)    Pulse 98    Temp 97.5 °F (36.4 °C) (Oral)    Resp 18    Ht 5' 6\" (1.676 m)    Wt 121 lb (54.9 kg)    SpO2 96%    BMI 19.53 kg/m2      Body mass index is 19.53 kg/(m^2).   Appearance: alert, well appearing, and in no distress. ENT normal.  Neck supple. No adenopathy or thyromegaly. PERRLA. Lungs are clear, good air entry, no wheezes, rhonchi or rales. S1 and S2 normal, no murmurs, regular rate and rhythm. Abdomen soft without tenderness, guarding, mass or organomegaly. No bruit. Impaired gait; Extremities show no edema, normal peripheral pulses. Neurological is normal, no focal findings. ASSESSMENT/PLAN:  Diagnoses and all orders for this visit:    1. Right hip pain  -     XR HIP RT W OR WO PELV 2-3 VWS; Future  -     MRI HIP  RT  WO CONT; Future    2. Fall, initial encounter  -     XR HIP RT W OR WO PELV 2-3 VWS; Future  -     MRI HIP  RT  WO CONT; Future    3. Right groin pain  -     XR HIP RT W OR WO PELV 2-3 VWS; Future  -     MRI HIP  RT  WO CONT; Future      Patient advised to return to clinic if symptoms persist or to ED if they become worse  Patient verbalized understanding to above instructions. Follow-up Disposition:  Return if symptoms worsen or fail to improve.      JOHN Covarrubias   10/16/2018   2:58 PM

## 2018-10-16 NOTE — PATIENT INSTRUCTIONS
Preventing Falls: Care Instructions  Your Care Instructions    Getting around your home safely can be a challenge if you have injuries or health problems that make it easy for you to fall. Loose rugs and furniture in walkways are among the dangers for many older people who have problems walking or who have poor eyesight. People who have conditions such as arthritis, osteoporosis, or dementia also have to be careful not to fall. You can make your home safer with a few simple measures. Follow-up care is a key part of your treatment and safety. Be sure to make and go to all appointments, and call your doctor if you are having problems. It's also a good idea to know your test results and keep a list of the medicines you take. How can you care for yourself at home? Taking care of yourself  · You may get dizzy if you do not drink enough water. To prevent dehydration, drink plenty of fluids, enough so that your urine is light yellow or clear like water. Choose water and other caffeine-free clear liquids. If you have kidney, heart, or liver disease and have to limit fluids, talk with your doctor before you increase the amount of fluids you drink. · Exercise regularly to improve your strength, muscle tone, and balance. Walk if you can. Swimming may be a good choice if you cannot walk easily. · Have your vision and hearing checked each year or any time you notice a change. If you have trouble seeing and hearing, you might not be able to avoid objects and could lose your balance. · Know the side effects of the medicines you take. Ask your doctor or pharmacist whether the medicines you take can affect your balance. Sleeping pills or sedatives can affect your balance. · Limit the amount of alcohol you drink. Alcohol can impair your balance and other senses. · Ask your doctor whether calluses or corns on your feet need to be removed.  If you wear loose-fitting shoes because of calluses or corns, you can lose your balance and fall. · Talk to your doctor if you have numbness in your feet. Preventing falls at home  · Remove raised doorway thresholds, throw rugs, and clutter. Repair loose carpet or raised areas in the floor. · Move furniture and electrical cords to keep them out of walking paths. · Use nonskid floor wax, and wipe up spills right away, especially on ceramic tile floors. · If you use a walker or cane, put rubber tips on it. If you use crutches, clean the bottoms of them regularly with an abrasive pad, such as steel wool. · Keep your house well lit, especially [place]ajoy[place] Killings, and outside walkways. Use night-lights in areas such as hallways and bathrooms. Add extra light switches or use remote switches (such as switches that go on or off when you clap your hands) to make it easier to turn lights on if you have to get up during the night. · Install sturdy handrails on stairways. · Move items in your cabinets so that the things you use a lot are on the lower shelves (about waist level). · Keep a cordless phone and a flashlight with new batteries by your bed. If possible, put a phone in each of the main rooms of your house, or carry a cell phone in case you fall and cannot reach a phone. Or, you can wear a device around your neck or wrist. You push a button that sends a signal for help. · Wear low-heeled shoes that fit well and give your feet good support. Use footwear with nonskid soles. Check the heels and soles of your shoes for wear. Repair or replace worn heels or soles. · Do not wear socks without shoes on wood floors. · Walk on the grass when the sidewalks are slippery. If you live in an area that gets snow and ice in the winter, sprinkle salt on slippery steps and sidewalks. Preventing falls in the bath  · Install grab bars and nonskid mats inside and outside your shower or tub and near the toilet and sinks. · Use shower chairs and bath benches.   · Use a hand-held shower head that will allow you to sit while showering. · Get into a tub or shower by putting the weaker leg in first. Get out of a tub or shower with your strong side first.  · Repair loose toilet seats and consider installing a raised toilet seat to make getting on and off the toilet easier. · Keep your bathroom door unlocked while you are in the shower. Where can you learn more? Go to http://jose-diana.info/. Enter [id number] [id number] in the search box to learn more about \"Preventing Falls: Care Instructions. \"  Current as of: March 16, 2018  Content Version: 11.8  © 6559-9949 [employer]. Care instructions adapted under license by [employer] (which disclaims liability or warranty for this information). If you have questions about a medical condition or this instruction, always ask your healthcare professional. [place] [address] any warranty or liability for your use of this information. Hip Pain: Care Instructions  Your Care Instructions    Hip pain may be caused by many things, including overuse, a fall, or a twisting movement. Another cause of hip pain is arthritis. Your pain may increase when you stand up, walk, or squat. The pain may come and go or may be constant. Home treatment can help relieve hip pain, swelling, and stiffness. If your pain is ongoing, you may need more tests and treatment. Follow-up care is a key part of your treatment and safety. Be sure to make and go to all appointments, and call your doctor if you are having problems. It's also a good idea to know your test results and keep a list of the medicines you take. How can you care for yourself at home? · Take pain medicines exactly as directed. ¨ If the doctor gave you a prescription medicine for pain, take it as prescribed. ¨ If you are not taking a prescription pain medicine, ask your doctor if you can take an over-the-counter medicine. · Rest and protect your hip.  Take a break from any activity, including standing or walking, that may cause pain. · Put ice or a cold pack against your hip for 10 to 20 minutes at a time. Try to do this every 1 to 2 hours for the next 3 days (when you are awake) or until the swelling goes down. Put a thin cloth between the ice and your skin. · Sleep on your healthy side with a pillow between your knees, or sleep on your back with pillows under your knees. · If there is no swelling, you can put moist heat, a heating pad, or a warm cloth on your hip. Do gentle stretching exercises to help keep your hip flexible. · Learn how to prevent falls. Have your vision and hearing checked regularly. Wear slippers or shoes with a nonskid sole. · Stay at a healthy weight. · Wear comfortable shoes. When should you call for help? Call 911 anytime you think you may need emergency care. For example, call if:    · You have sudden chest pain and shortness of breath, or you cough up blood.     · You are not able to stand or walk or bear weight.     · Your buttocks, legs, or feet feel numb or tingly.     · Your leg or foot is cool or pale or changes color.     · You have severe pain.    Call your doctor now or seek immediate medical care if:    · You have signs of infection, such as:  ¨ Increased pain, swelling, warmth, or redness in the hip area. ¨ Red streaks leading from the hip area. ¨ Pus draining from the hip area. ¨ A fever.     · You have signs of a blood clot, such as:  ¨ Pain in your calf, back of the knee, thigh, or groin. ¨ Redness and swelling in your leg or groin.     · You are not able to bend, straighten, or move your leg normally.     · You have trouble urinating or having bowel movements.    Watch closely for changes in your health, and be sure to contact your doctor if:    · You do not get better as expected. Where can you learn more? Go to http://jose-diana.info/.   Enter G859 in the search box to learn more about \"Hip Pain: Care Instructions. \"  Current as of: November 20, 2017  Content Version: 11.8  © 8950-3892 [employer], [facility]. Care instructions adapted under license by [employer] (which disclaims liability or warranty for this information). If you have questions about a medical condition or this instruction, always ask your healthcare professional. [place] [address] any warranty or liability for your use of this information.

## 2018-10-24 ENCOUNTER — CLINICAL SUPPORT (OUTPATIENT)
Dept: FAMILY MEDICINE CLINIC | Age: 58
End: 2018-10-24

## 2018-10-24 DIAGNOSIS — Z23 ENCOUNTER FOR IMMUNIZATION: Primary | ICD-10-CM

## 2018-10-24 NOTE — PROGRESS NOTES
Joanna Valdes is a 62 y.o. female here today for her flu shot. She tolerated well and left showing no s/s of distress.

## 2018-10-25 ENCOUNTER — HOSPITAL ENCOUNTER (OUTPATIENT)
Dept: MRI IMAGING | Age: 58
Discharge: HOME | End: 2018-10-25
Attending: NURSE PRACTITIONER
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
## 2018-10-25 DIAGNOSIS — M25.551 RIGHT HIP PAIN: ICD-10-CM

## 2018-10-25 DIAGNOSIS — R10.31 RIGHT GROIN PAIN: ICD-10-CM

## 2019-05-15 ENCOUNTER — OFFICE VISIT (OUTPATIENT)
Dept: FAMILY MEDICINE CLINIC | Age: 59
End: 2019-05-15

## 2019-05-15 VITALS
HEIGHT: 66 IN | TEMPERATURE: 98.7 F | WEIGHT: 143 LBS | RESPIRATION RATE: 18 BRPM | HEART RATE: 79 BPM | DIASTOLIC BLOOD PRESSURE: 88 MMHG | OXYGEN SATURATION: 98 % | BODY MASS INDEX: 22.98 KG/M2 | SYSTOLIC BLOOD PRESSURE: 135 MMHG

## 2019-05-15 DIAGNOSIS — C56.2 OVARIAN CANCER, LEFT (HCC): ICD-10-CM

## 2019-05-15 DIAGNOSIS — Z12.11 SCREEN FOR COLON CANCER: ICD-10-CM

## 2019-05-15 DIAGNOSIS — Z71.89 ADVANCED DIRECTIVES, COUNSELING/DISCUSSION: ICD-10-CM

## 2019-05-15 DIAGNOSIS — Z13.39 SCREENING FOR ALCOHOLISM: ICD-10-CM

## 2019-05-15 DIAGNOSIS — M89.9 DISORDER OF BONE AND CARTILAGE: ICD-10-CM

## 2019-05-15 DIAGNOSIS — Z01.89 LABORATORY TEST: ICD-10-CM

## 2019-05-15 DIAGNOSIS — Z00.00 INITIAL MEDICARE ANNUAL WELLNESS VISIT: Primary | ICD-10-CM

## 2019-05-15 DIAGNOSIS — M94.9 DISORDER OF BONE AND CARTILAGE: ICD-10-CM

## 2019-05-15 DIAGNOSIS — Z11.59 NEED FOR HEPATITIS C SCREENING TEST: ICD-10-CM

## 2019-05-15 DIAGNOSIS — Z12.31 ENCOUNTER FOR SCREENING MAMMOGRAM FOR MALIGNANT NEOPLASM OF BREAST: ICD-10-CM

## 2019-05-15 NOTE — PATIENT INSTRUCTIONS
Medicare Wellness Visit, Female The best way to live healthy is to have a lifestyle where you eat a well-balanced diet, exercise regularly, limit alcohol use, and quit all forms of tobacco/nicotine, if applicable. Regular preventive services are another way to keep healthy. Preventive services (vaccines, screening tests, monitoring & exams) can help personalize your care plan, which helps you manage your own care. Screening tests can find health problems at the earliest stages, when they are easiest to treat. [facility] follows the current, evidence-based guidelines published by the [facility] ([facility]) when recommending preventive services for our patients. Because we follow these guidelines, sometimes recommendations change over time as research supports it. (For example, mammograms used to be recommended annually. Even though Medicare will still pay for an annual mammogram, the newer guidelines recommend a mammogram every two years for women of average risk.) Of course, you and your doctor may decide to screen more often for some diseases, based on your risk and your health status. Preventive services for you include: - Medicare offers their members a free annual wellness visit, which is time for you and your primary care provider to discuss and plan for your preventive service needs. Take advantage of this benefit every year! 
-All adults over the age of 72 should receive the recommended pneumonia vaccines. Current USPSTF guidelines recommend a series of two vaccines for the best pneumonia protection.  
-All adults should have a flu vaccine yearly and a tetanus vaccine every 10 years. All adults age 61 and older should receive a shingles vaccine once in their lifetime.   
-A bone mass density test is recommended when a woman turns 65 to screen for osteoporosis. This test is only recommended one time, as a screening. Some providers will use this same test as a disease monitoring tool if you already have osteoporosis. -All adults age 38-68 who are overweight should have a diabetes screening test once every three years.  
-Other screening tests and preventive services for persons with diabetes include: an eye exam to screen for diabetic retinopathy, a kidney function test, a foot exam, and stricter control over your cholesterol.  
-Cardiovascular screening for adults with routine risk involves an electrocardiogram (ECG) at intervals determined by your doctor.  
-Colorectal cancer screenings should be done for adults age 54-65 with no increased risk factors for colorectal cancer. There are a number of acceptable methods of screening for this type of cancer. Each test has its own benefits and drawbacks. Discuss with your doctor what is most appropriate for you during your annual wellness visit. The different tests include: colonoscopy (considered the best screening method), a fecal occult blood test, a fecal DNA test, and sigmoidoscopy. -Breast cancer screenings are recommended every other year for women of normal risk, age 54-69. 
-Cervical cancer screenings for women over age 72 are only recommended with certain risk factors.  
-All adults born between [facility] should be screened once for Hepatitis C. Here is a list of your current Health Maintenance items (your personalized list of preventive services) with a due date: 
Health Maintenance Due Topic Date Due  
 Hepatitis C Test  1960  
 DTaP/Tdap/Td  (1 - Tdap) 08/17/1981  Pap Test  08/17/1981  Shingles Vaccine (1 of 2) 08/17/2010  Mammogram  08/17/2010 [facility] Annual Well Visit  10/15/2018  Stool testing for trace blood  03/05/2019

## 2019-05-15 NOTE — ACP (ADVANCE CARE PLANNING)
Advance Care Planning    Advance Care Planning (ACP) Provider Note - Comprehensive     Date of ACP Conversation: 05/15/19  Persons included in Conversation:  patient and POA  Length of ACP Conversation in minutes:  16 minutes    Authorized Decision Maker (if patient is incapable of making informed decisions): This person is:  Healthcare Agent/Medical Power of  under Advance Directive          General ACP for ALL Patients with Decision Making Capacity:   Is legally incompetent due to medical History. Review of Existing Advance Directive:  Unable to fill form dut to medical history. For Serious or Chronic Illness:  Unable to comprehend. Interventions Provided:  POA who is her brother will make decision.

## 2019-05-15 NOTE — PROGRESS NOTES
Colie Leyden is a 62 y.o. female here for [address] She needs a from completed for her to go to summer camp. 3 most recent PHQ Screens 5/15/2019   Little interest or pleasure in doing things Not at all   Feeling down, depressed, irritable, or hopeless Not at all   Total Score PHQ 2 0     Fall Risk Assessment, last 12 mths 5/15/2019   Able to walk? Yes   Fall in past 12 months? Yes   Fall with injury? Yes   Number of falls in past 12 months 1   Fall Risk Score 2     Abuse Screening Questionnaire 5/15/2019   Do you ever feel afraid of your partner? N   Are you in a relationship with someone who physically or mentally threatens you? N   Is it safe for you to go home?  Y     ADL Assessment 5/15/2019   Feeding yourself No Help Needed   Getting from bed to chair No Help Needed   Getting dressed No Help Needed   Bathing or showering No Help Needed   Walk across the room (includes cane/walker) No Help Needed   Using the telphone Help Needed   Taking your medications Help Needed   Preparing meals Help Needed   Managing money (expenses/bills) Help Needed   Moderately strenuous housework (laundry) Help Needed   Shopping for personal items (toiletries/medicines) Help Needed   Shopping for groceries Help Needed   Driving Help Needed   Climbing a flight of stairs No Help Needed   Getting to places beyond walking distances Help Needed

## 2019-05-15 NOTE — PROGRESS NOTES
Today's Date:  5/15/2019   Patient's Name: Virgilio Menchaca   Patient's :  1960     Subjective:  Chief Complaint   Patient presents with    Annual Wellness Visit    Physical       Virgilio Menchaca  Is a 62 y.o.  female  who presents for physical and form to be fill for Summer camp. She will be going [employer] She is brought in by her brother. Has no complaints. She is very talkative today and engaged in conversation. Past Medical History:  Past Medical History:   Diagnosis Date    Cancer ([facility] [address] 2018    ovarian    Cerebral palsy ([facility] [address]     Hyperkinesis     Mental retardation        Past Surgical History:  Past Surgical History:   Procedure Laterality Date    HX GYN  2018    hysterectomy    HX OTHER SURGICAL      REMOVAL OF MOLE       Social history:  Social History     Socioeconomic History    Marital status: SINGLE     Spouse name: Not on file    Number of children: Not on file    Years of education: Not on file    Highest education level: Not on file   Tobacco Use    Smoking status: Never Smoker    Smokeless tobacco: Never Used   Substance and Sexual Activity    Alcohol use: No    Drug use: No       Medications:  Current Outpatient Medications   Medication Sig Dispense Refill    ondansetron hcl (ZOFRAN) 8 mg tablet Take 8 mg by mouth every eight (8) hours as needed for Nausea.  dexamethasone (DECADRON) 4 mg tablet Take  by mouth every twelve (12) hours.  metoclopramide HCl (REGLAN) 10 mg tablet Take 10 mg by mouth Before breakfast, lunch, dinner and at bedtime.  LORazepam (ATIVAN) 1 mg tablet Take  by mouth every four (4) hours as needed for Anxiety.          Allergies:  No Known Allergies    Past Family History:   Family History   Problem Relation Age of Onset   JuliusArchana Cancer Mother     Cancer Father        REVIEW OF SYSTEMS:   Ophtho  no vision change or eye pain  Oral  no mouth pain, tongue or tooth problems  Ears  no hearing loss, ear pain, fullness, no swallowing problems  Cardiac  no CP, PND, orthopnea, edema, palpitations or syncope  Resp  no wheezing, chronic coughing, dyspnea  GI  no heartburn, nausea, vomiting, change in bowel habits, bleeding, hemorrhoids  Urinary  no dysuria, hematuria, flank pain, urgency, frequency  Ortho  no swelling, dec ROM, myalgias  Derm  no nail abnormalities, rashes, lesions of note, hair loss  Psych  denies any anxiety or depression symptoms, no hallucinations or violent ideation  Constitutional  no wt loss, night sweats, unexplained fevers  Neuro  no focal weakness, numbness, paresthesias, incoordination, ataxia, involuntary movements  Endo - no polyuria, polydipsia, nocturia, hot flashes    PHYSICAL EXAM:  Visit Vitals  /88 (BP 1 Location: Left arm, BP Patient Position: Sitting)   Pulse 79   Temp 98.7 °F (37.1 °C) (Oral)   Resp 18   Ht 5' 6\" (1.676 m)   Wt 143 lb (64.9 kg)   SpO2 98%   BMI 23.08 kg/m²     Pt alert and oriented x 3. Affect is appropriate. Mood stable. No apparent distress  HEENT -- PERRL, EOMI, conjunctiva and lids normal,   ear canals normal. tympanic membranes normal, Sinuses were nontender, turbinates normal, hearing normal.  Oropharynx  without erythema, normal tongue, oral mucosa and tonsils. Neck--Supple, trachea midline. No adenopathy, thyromegaly, JVD, or bruits. Lungs --Clear to auscultation and percussion, normal percussion. Heart --Regular rate and rhythm, no murmurs, rubs, gallops, or clicks. Abdomen -- Soft and nontender, no hepatosplenomegaly or masses. Extremities -- Without cyanosis, clubbing, edema. 2+ pulses equally and bilaterally. Normal looking digits, ROM intact  Neuro -- CN 2-12 grossly intact, strength at baseline. Impaired gait. Derm-- no obvious abnormalities noted, no rash or redness, skin warm, moist, and dry. Assessment:         ICD-10-CM ICD-9-CM    1. Initial Medicare annual wellness visit Z00.00 V70.0    2. Ovarian cancer, left (HCC) C56.2 183.0    3.  Screening for alcoholism Z13.39 V79.1 [place] ANNUAL ALCOHOL SCREEN 15 MIN   4. Screen for colon cancer Z12.11 V76.51 OCCULT BLOOD IMMUNOASSAY,DIAGNOSTIC   5. Advanced directives, counseling/discussion Z71.89 V65.49 ADVANCE CARE PLANNING FIRST 30 MINS   6. Need for hepatitis C screening test Z11.59 V73.89 HEPATITIS C AB   7. Disorder of bone and cartilage M89.9 733.90 DEXA BONE DENSITY STUDY AXIAL    M94.9     8. Encounter for screening mammogram for malignant neoplasm of breast Z12.31 V76.12    9. Laboratory test Z01.89 V72.60 LIPID PANEL      TSH 3RD GENERATION      T4, FREE       Patient verbalized understanding and is in agreement with treatment plan as outlined above. All questions answered. Follow-up and Dispositions    · Return in about 3 months (around 8/15/2019), or if symptoms worsen or fail to improve.      Yaneth Lewis, LUIS ANGELP

## 2019-05-15 NOTE — PROGRESS NOTES
This is an Initial Medicare Annual Wellness Exam (AWV) (Performed 12 months after IPPE or effective date of Medicare Part B enrollment, Once in a lifetime)    I have reviewed the patient's medical history in detail and updated the computerized patient record. History     Past Medical History:   Diagnosis Date    Cancer ([facility] [address] 2018    ovarian    Cerebral palsy ([facility] [address]     Hyperkinesis     Mental retardation       Past Surgical History:   Procedure Laterality Date    HX GYN  2018    hysterectomy    HX OTHER SURGICAL      REMOVAL OF MOLE     Current Outpatient Medications   Medication Sig Dispense Refill    ondansetron hcl (ZOFRAN) 8 mg tablet Take 8 mg by mouth every eight (8) hours as needed for Nausea.  dexamethasone (DECADRON) 4 mg tablet Take  by mouth every twelve (12) hours.  metoclopramide HCl (REGLAN) 10 mg tablet Take 10 mg by mouth Before breakfast, lunch, dinner and at bedtime.  LORazepam (ATIVAN) 1 mg tablet Take  by mouth every four (4) hours as needed for Anxiety. No Known Allergies  Family History   Problem Relation Age of Onset    Cancer Mother     Cancer Father      Social History     Tobacco Use    Smoking status: Never Smoker    Smokeless tobacco: Never Used   Substance Use Topics    Alcohol use: No     Patient Active Problem List   Diagnosis Code    Lactic acidosis E87.2    Leukocytosis D72.829    Ascites R18.8    Mesenteric mass K63.9    Ovarian cancer, left (HCC) C56.2    Cancer (HCC) C80.1    Mental retardation F79       Depression Risk Factor Screening:     3 most recent PHQ Screens 5/15/2019   Little interest or pleasure in doing things Not at all   Feeling down, depressed, irritable, or hopeless Not at all   Total Score PHQ 2 0     Alcohol Risk Factor Screening: You do not drink alcohol or very rarely. Functional Ability and Level of Safety:     Hearing Loss  Hearing is good.     Activities of Daily Living  The home contains: handrails and grab bars  Patient needs help with:  transportation, shopping, preparing meals, laundry, housework, managing medications, managing money, bathing, hygiene and bathroom needs    Fall Risk  Fall Risk Assessment, last 12 mths 5/15/2019   Able to walk? Yes   Fall in past 12 months? Yes   Fall with injury? Yes   Number of falls in past 12 months 1   Fall Risk Score 2       Abuse Screen  Patient is not abused    Cognitive Screening   Evaluation of Cognitive Function:  Has your family/caregiver stated any concerns about your memory: no  Normal, Mini Cog test    Patient Care Team   Patient Care Team:  JOHN Fernandez as PCP - General (Nurse Practitioner)  Duran Almanzar MD as Physician (Gynecology)    Assessment/Plan   Education and counseling provided:  Are appropriate based on today's review and evaluation  Screening Mammography  Colorectal cancer screening tests  Bone mass measurement (DEXA)    Diagnoses and all orders for this visit:    1. Initial Medicare annual wellness visit    2. Ovarian cancer, left ([address]    3. Screening for alcoholism  -     [place] ANNUAL ALCOHOL SCREEN 15 MIN    4. Screen for colon cancer  -     OCCULT BLOOD IMMUNOASSAY,DIAGNOSTIC; Future    5. Advanced directives, counseling/discussion  -     ADVANCE CARE PLANNING FIRST 30 MINS    6. Need for hepatitis C screening test  -     HEPATITIS C AB; Future    7. Disorder of bone and cartilage  -     DEXA BONE DENSITY STUDY AXIAL; Future    8. Encounter for screening mammogram for malignant neoplasm of breast    9. Laboratory test  -     LIPID PANEL; Future  -     TSH 3RD GENERATION; Future  -     T4, FREE;  Future        Health Maintenance Due   Topic Date Due    Hepatitis C Screening  1960    DTaP/Tdap/Td series (1 - Tdap) 08/17/1981    PAP AKA CERVICAL CYTOLOGY  08/17/1981    Shingrix Vaccine Age 50> (1 of 2) 08/17/2010    BREAST CANCER SCRN MAMMOGRAM  08/17/2010    MEDICARE YEARLY EXAM  10/15/2018    FOBT Q 1 YEAR AGE 50-75  03/05/2019

## 2019-05-29 ENCOUNTER — OFFICE VISIT (OUTPATIENT)
Dept: FAMILY MEDICINE CLINIC | Age: 59
End: 2019-05-29

## 2019-05-29 VITALS
SYSTOLIC BLOOD PRESSURE: 130 MMHG | DIASTOLIC BLOOD PRESSURE: 84 MMHG | WEIGHT: 145 LBS | HEART RATE: 101 BPM | BODY MASS INDEX: 23.3 KG/M2 | TEMPERATURE: 97.7 F | RESPIRATION RATE: 18 BRPM | HEIGHT: 66 IN | OXYGEN SATURATION: 95 %

## 2019-05-29 DIAGNOSIS — M85.80 OSTEOPENIA, UNSPECIFIED LOCATION: Primary | ICD-10-CM

## 2019-06-14 NOTE — PROGRESS NOTES
Patient: Gutierrez Cifuentes  Date of Service: 6/14/2019   Provider: JOHN Garza     REASON FOR VISIT:   No chief complaint on file. HISTORY OF PRESENT ILLNESS:   Gutierrez Cifuentes is a 62 y.o. female who presents to the office with her brother for Lab result and to retrieve Physical form. She is doing well and has no complaints. ALLERGIES:   No Known Allergies     ACTIVE MEDICAL PROBLEMS:  Patient Active Problem List   Diagnosis Code    Lactic acidosis E87.2    Leukocytosis D72.829    Ascites R18.8    Mesenteric mass K63.9    Ovarian cancer, left (HCC) C56.2    Cancer (HCC) C80.1    Mental retardation F79        MEDICATIONS:   Current Outpatient Medications   Medication Sig Dispense Refill    ondansetron hcl (ZOFRAN) 8 mg tablet Take 8 mg by mouth every eight (8) hours as needed for Nausea.  dexamethasone (DECADRON) 4 mg tablet Take  by mouth every twelve (12) hours.  metoclopramide HCl (REGLAN) 10 mg tablet Take 10 mg by mouth Before breakfast, lunch, dinner and at bedtime.  LORazepam (ATIVAN) 1 mg tablet Take  by mouth every four (4) hours as needed for Anxiety. ROS:   Pertinent positive as above in HPI. All others negative. PHYSICAL EXAMINATION:  Visit Vitals  /84 (BP 1 Location: Right arm, BP Patient Position: Sitting)   Pulse (!) 101   Temp 97.7 °F (36.5 °C) (Oral)   Resp 18   Ht 5' 6\" (1.676 m)   Wt 145 lb (65.8 kg)   SpO2 95%   BMI 23.40 kg/m²      Body mass index is 23.4 kg/m². Appearance: alert, well appearing, and in no distress. Lungs: normal breathing   Neurological is normal, no focal findings. ASSESSMENT/PLAN:  Diagnoses and all orders for this visit:    1. Osteopenia, unspecified location    Discussed laboratory and test results with patient and  bother. Questions were answered. Both verbalized understanding. Original physical form given to brother and copy will be scan in the chart.     Patient advised to return to clinic if symptoms persist or to ED if they become worse. Patient verbalized understanding to above instructions.        JOHN Elliott   6/14/2019   2:47 PM

## 2019-08-06 ENCOUNTER — OFFICE VISIT (OUTPATIENT)
Dept: FAMILY MEDICINE CLINIC | Age: 59
End: 2019-08-06

## 2019-08-06 VITALS
OXYGEN SATURATION: 97 % | RESPIRATION RATE: 18 BRPM | DIASTOLIC BLOOD PRESSURE: 88 MMHG | TEMPERATURE: 97.7 F | WEIGHT: 148 LBS | BODY MASS INDEX: 23.78 KG/M2 | HEIGHT: 66 IN | SYSTOLIC BLOOD PRESSURE: 131 MMHG | HEART RATE: 99 BPM

## 2019-08-06 DIAGNOSIS — H61.23 CERUMEN DEBRIS ON TYMPANIC MEMBRANE OF BOTH EARS: ICD-10-CM

## 2019-08-06 DIAGNOSIS — Z02.9 ENCOUNTER FOR ADMINISTRATIVE EXAMINATIONS: Primary | ICD-10-CM

## 2019-08-06 NOTE — PROGRESS NOTES
Rosalia Solorio is a 62 y.o. female here today to have a CPE and paper work filled out. She is here with her brother and he has no concerns at this time.

## 2019-08-06 NOTE — PROGRESS NOTES
Patient: Bandar Arauz  Date of Service: 8/6/2019   Provider: JOHN Hopkins     REASON FOR VISIT:   Chief Complaint   Patient presents with    Complete Physical        HISTORY OF PRESENT ILLNESS:   Bandar Arauz is a 62 y.o. female who presents to the office with her brother with physical form to be filled. Both have no concern at this time. ALLERGIES:   No Known Allergies     ACTIVE MEDICAL PROBLEMS:  Patient Active Problem List   Diagnosis Code    Lactic acidosis E87.2    Leukocytosis D72.829    Ascites R18.8    Mesenteric mass K63.9    Ovarian cancer, left (HCC) C56.2    Cancer (HCC) C80.1    Mental retardation F79        MEDICATIONS:   Current Outpatient Medications   Medication Sig Dispense Refill    LORazepam (ATIVAN) 1 mg tablet Take  by mouth every four (4) hours as needed for Anxiety.  ondansetron hcl (ZOFRAN) 8 mg tablet Take 8 mg by mouth every eight (8) hours as needed for Nausea.  dexamethasone (DECADRON) 4 mg tablet Take  by mouth every twelve (12) hours.  metoclopramide HCl (REGLAN) 10 mg tablet Take 10 mg by mouth Before breakfast, lunch, dinner and at bedtime. ROS:   Pertinent positive as above in HPI. All others negative. PHYSICAL EXAMINATION:  Visit Vitals  /88 (BP 1 Location: Right arm, BP Patient Position: Sitting)   Pulse 99   Temp 97.7 °F (36.5 °C) (Oral)   Resp 18   Ht 5' 6\" (1.676 m)   Wt 148 lb (67.1 kg)   SpO2 97%   BMI 23.89 kg/m²      Body mass index is 23.89 kg/m². Appearance: alert, well appearing, and in no distress. Increased amount of soft cerumen in both ear canals. Neck supple. No adenopathy or thyromegaly. Lungs: normal rate   Valgus knees; Extremities show no edema, normal peripheral pulses. Neurological is normal, no focal findings. ASSESSMENT/PLAN:  Diagnoses and all orders for this visit:    1. Encounter for administrative examinations    2.  Cerumen debris on tympanic membrane of both ears    Brother was asked to go to [facility] for PPD placement and reading. Will return signed form to him when he bring result of PPD. Brother will continue instilling debrox in both ears. Patient advised to return to clinic if symptoms persist or to ED if they become worse  Patient verbalized understanding to above instructions. Follow-up and Dispositions    · Return if symptoms worsen or fail to improve.          JOHN Stroud   8/6/2019   9:11 AM no nasal congestion

## 2019-08-06 NOTE — PATIENT INSTRUCTIONS
Earwax Blockage: Care Instructions Your Care Instructions Earwax is a natural substance that protects the ear canal. Normally, earwax drains from the ears and does not cause problems. Sometimes earwax builds up and hardens. Earwax blockage (also called cerumen impaction) can cause some loss of hearing and pain. When wax is tightly packed, you will need to have your doctor remove it. Follow-up care is a key part of your treatment and safety. Be sure to make and go to all appointments, and call your doctor if you are having problems. It's also a good idea to know your test results and keep a list of the medicines you take. How can you care for yourself at home? · Do not try to remove earwax with cotton swabs, fingers, or other objects. This can make the blockage worse and damage the eardrum. · If your doctor recommends that you try to remove earwax at home: ? Soften and loosen the earwax with warm mineral oil. You also can try hydrogen peroxide mixed with an equal amount of room temperature water. Place 2 drops of the fluid, warmed to body temperature, in the ear two times a day for up to 5 days. ? Once the wax is loose and soft, all that is usually needed to remove it from the ear canal is a gentle, warm shower. Direct the water into the ear, then tip your head to let the earwax drain out. Dry your ear thoroughly with a hair dryer set on low. Hold the dryer several inches from your ear. ? If the warm mineral oil and shower do not work, use an over-the-counter wax softener. Read and follow all instructions on the label. After using the wax softener, use an ear syringe to gently flush the ear. Make sure the flushing solution is body temperature. Cool or hot fluids in the ear can cause dizziness. When should you call for help? Call your doctor now or seek immediate medical care if: 
  · Pus or blood drains from your ear.  
  · Your ears are ringing or feel full.  
  · You have a loss of hearing.  Watch closely for changes in your health, and be sure to contact your doctor if: 
  · You have pain or reduced hearing after 1 week of home treatment.  
  · You have any new symptoms, such as nausea or balance problems. Where can you learn more? Go to http://jose-diana.info/. Enter J168 in the search box to learn more about \"Earwax Blockage: Care Instructions. \" Current as of: September 23, 2018 Content Version: 12.1 © 8458-3251 [employer]. Care instructions adapted under license by [employer] (which disclaims liability or warranty for this information). If you have questions about a medical condition or this instruction, always ask your healthcare professional. [address] any warranty or liability for your use of this information.

## 2019-09-18 ENCOUNTER — HOSPITAL ENCOUNTER (OUTPATIENT)
Dept: LAB | Age: 59
Discharge: HOME | End: 2019-09-18
Payer: MEDICARE

## 2019-09-18 ENCOUNTER — CLINICAL SUPPORT (OUTPATIENT)
Dept: FAMILY MEDICINE CLINIC | Age: 59
End: 2019-09-18

## 2019-09-18 ENCOUNTER — OFFICE VISIT (OUTPATIENT)
Dept: FAMILY MEDICINE CLINIC | Age: 59
End: 2019-09-18

## 2019-09-18 VITALS
SYSTOLIC BLOOD PRESSURE: 138 MMHG | HEART RATE: 77 BPM | BODY MASS INDEX: 23.89 KG/M2 | HEIGHT: 66 IN | DIASTOLIC BLOOD PRESSURE: 90 MMHG | OXYGEN SATURATION: 96 % | TEMPERATURE: 98.7 F | RESPIRATION RATE: 18 BRPM

## 2019-09-18 DIAGNOSIS — Z98.818 OTHER DENTAL PROCEDURE STATUS: ICD-10-CM

## 2019-09-18 DIAGNOSIS — M85.80 OSTEOPENIA, UNSPECIFIED LOCATION: ICD-10-CM

## 2019-09-18 DIAGNOSIS — H61.23 CERUMEN DEBRIS ON TYMPANIC MEMBRANE OF BOTH EARS: ICD-10-CM

## 2019-09-18 DIAGNOSIS — Z01.818 PREOP GENERAL PHYSICAL EXAM: Primary | ICD-10-CM

## 2019-09-18 DIAGNOSIS — C56.2 OVARIAN CANCER, LEFT (HCC): ICD-10-CM

## 2019-09-18 DIAGNOSIS — Z01.818 PREOP GENERAL PHYSICAL EXAM: ICD-10-CM

## 2019-09-18 DIAGNOSIS — R73.9 ELEVATED BLOOD SUGAR: ICD-10-CM

## 2019-09-18 LAB
ALBUMIN SERPL-MCNC: 4.2 G/DL (ref 3.4–5)
ALBUMIN/GLOB SERPL: 1.3 {RATIO} (ref 0.8–1.7)
ALP SERPL-CCNC: 104 U/L (ref 45–117)
ALT SERPL-CCNC: 20 U/L (ref 13–56)
ANION GAP SERPL CALC-SCNC: 7 MMOL/L (ref 3–18)
AST SERPL-CCNC: 12 U/L (ref 10–38)
BILIRUB SERPL-MCNC: 0.5 MG/DL (ref 0.2–1)
BUN SERPL-MCNC: 14 MG/DL (ref 7–18)
BUN/CREAT SERPL: 20 (ref 12–20)
CALCIUM SERPL-MCNC: 9.8 MG/DL (ref 8.5–10.1)
CHLORIDE SERPL-SCNC: 104 MMOL/L (ref 100–111)
CO2 SERPL-SCNC: 30 MMOL/L (ref 21–32)
CREAT SERPL-MCNC: 0.69 MG/DL (ref 0.6–1.3)
GLOBULIN SER CALC-MCNC: 3.2 G/DL (ref 2–4)
GLUCOSE SERPL-MCNC: 102 MG/DL (ref 74–99)
HCT VFR BLD AUTO: 43.3 % (ref 35–45)
HGB BLD-MCNC: 13.6 G/DL (ref 12–16)
POTASSIUM SERPL-SCNC: 4.8 MMOL/L (ref 3.5–5.5)
PROT SERPL-MCNC: 7.4 G/DL (ref 6.4–8.2)
SODIUM SERPL-SCNC: 141 MMOL/L (ref 136–145)

## 2019-09-18 PROCEDURE — 83036 HEMOGLOBIN GLYCOSYLATED A1C: CPT

## 2019-09-18 PROCEDURE — 80053 COMPREHEN METABOLIC PANEL: CPT

## 2019-09-18 PROCEDURE — 85018 HEMOGLOBIN: CPT

## 2019-09-18 NOTE — PROGRESS NOTES
Preoperative Evaluation    Date of Exam: 2019    Patient:  Daija Dominguez  Patient's :  1960  Referring Physician: Dr. Gerri Coyne Date: 2019    Daija Dominguez  Is a 61 y.o.  female  who presents for preoperative evaluation. She is here with her brother. Procedure/Surgery: Dental Treatment  Date of Procedure/Surgery: To Be Schedule after Pre-op examination  Surgeon: Dr. Margarito Zambrano,  [facility]: [facility]. Primary Physician: JOHN Corona    Latex Allergy: no    Recent use of: No recent use of aspirin (ASA), NSAIDS or steroids    Tetanus up to date: last tetanus booster within 10 years    Anesthesia Complications: Has never had anesthesia.   History of abnormal bleeding : None  History of Blood Transfusions: Unknown  Health Care Directive or Living Will: no    PMH, PSH, Social Hx, Family Hx, Meds and Allergies     Past Surgical History:   Procedure Laterality Date    HX GYN  2018    hysterectomy    HX OTHER SURGICAL      REMOVAL OF MOLE      Social History     Socioeconomic History    Marital status: SINGLE     Spouse name: Not on file    Number of children: Not on file    Years of education: Not on file    Highest education level: Not on file   Occupational History    Not on file   Social Needs    Financial resource strain: Not on file    Food insecurity:     Worry: Not on file     Inability: Not on file    Transportation needs:     Medical: Not on file     Non-medical: Not on file   Tobacco Use    Smoking status: Never Smoker    Smokeless tobacco: Never Used   Substance and Sexual Activity    Alcohol use: No    Drug use: No    Sexual activity: Not on file   Lifestyle    Physical activity:     Days per week: Not on file     Minutes per session: Not on file    Stress: Not on file   Relationships    Social connections:     Talks on phone: Not on file     Gets together: Not on file     Attends Sabianism service: Not on file     Active member of club or organization: Not on file     Attends meetings of clubs or organizations: Not on file     Relationship status: Not on file    Intimate partner violence:     Fear of current or ex partner: Not on file     Emotionally abused: Not on file     Physically abused: Not on file     Forced sexual activity: Not on file   Other Topics Concern    Not on file   Social History Narrative    Not on file     Family History   Problem Relation Age of Onset    Cancer Mother     Cancer Father      Current Outpatient Medications on File Prior to Visit   Medication Sig Dispense Refill    LORazepam (ATIVAN) 1 mg tablet Take  by mouth every four (4) hours as needed for Anxiety.  ondansetron hcl (ZOFRAN) 8 mg tablet Take 8 mg by mouth every eight (8) hours as needed for Nausea.  dexamethasone (DECADRON) 4 mg tablet Take  by mouth every twelve (12) hours.  metoclopramide HCl (REGLAN) 10 mg tablet Take 10 mg by mouth Before breakfast, lunch, dinner and at bedtime. No current facility-administered medications on file prior to visit.        No Known Allergies    REVIEW OF SYSTEMS:    General: negative for - chills, fatigue, fever, weight change  Psych: negative for - anxiety, depression, irritability or mood swings  ENT: negative for - headaches, hearing change, nasal congestion, oral lesions, sneezing or sore throat  Heme/ Lymph: negative for - bleeding problems, bruising, pallor or swollen lymph nodes  Endo: negative for - hot flashes, polydipsia/polyuria or temperature intolerance  Resp: negative for - cough, shortness of breath or wheezing  CV: negative for - chest pain, edema or palpitations  GI: negative for - abdominal pain, change in bowel habits, constipation, diarrhea or nausea/vomiting  : negative for - dysuria, hematuria, incontinence, pelvic pain or vulvar/vaginal symptoms  MSK: negative for - joint pain, joint swelling or muscle pain  Neuro: negative for - confusion, headaches, seizures or weakness  Derm: negative for - dry skin, hair changes, rash or skin lesion changes    EXAM:   Vitals:    09/18/19 0849 09/18/19 0930   BP: (!) 142/95 138/90   Pulse: 77    Resp: 18    Temp: 98.7 °F (37.1 °C)    TempSrc: Oral    SpO2: 96%    Height: 5' 6\" (1.676 m)        General:   well-nourished, well-groomed, pleasant, alert, in no acute distress. Head:  Normocephalic, atraumatic, MMM  Ears:  External ears WNL, bilateral ear impaction, brother to clean this at home with OTC Debrox. Nose:  External nares WNL  Neck:  Neck supple with normal ROM for age, no thyromegaly  Cardiovasc:   Regular rate and rhythm, no murmurs, no rubs, no gallops  Pulmonary:   Clear breath sounds bilaterally, good air movement, no wheezing, no rales, no rhonchi, normal respiratory effort  Abdomen:   Abdomen soft, nontender, nondistended, NABS  Extremities:   No edema, warm and well-perfused  Neuro:   Alert, conversant, appropriate, following commands, no focal deficits  Skin:    No rash or skin changes  Psych:  Apprehensive (brother gave her ativan before visit)    Labwork and Ancillary Studies     DIAGNOSTICS:   1. EKG: EKG FINDINGS - normal EKG, normal sinus rhythm: multiple artifacts difficult for patient to stay still.    3. Labs:     Lab Results   Component Value Date/Time    WBC 5.5 07/15/2019 02:29 PM    HGB 13.6 09/18/2019 09:13 AM    HCT 43.3 09/18/2019 09:13 AM    PLATELET 563 29/11/8087 02:29 PM    MCV 92.3 07/15/2019 02:29 PM     Lab Results   Component Value Date/Time    Sodium 141 09/18/2019 09:13 AM    Potassium 4.8 09/18/2019 09:13 AM    Chloride 104 09/18/2019 09:13 AM    CO2 30 09/18/2019 09:13 AM    Anion gap 7 09/18/2019 09:13 AM    Glucose 102 (H) 09/18/2019 09:13 AM    BUN 14 09/18/2019 09:13 AM    Creatinine 0.69 09/18/2019 09:13 AM    BUN/Creatinine ratio 20 09/18/2019 09:13 AM    GFR est AA >60 09/18/2019 09:13 AM    GFR est non-AA >60 09/18/2019 09:13 AM Calcium 9.8 09/18/2019 09:13 AM    Bilirubin, total 0.5 09/18/2019 09:13 AM    AST (SGOT) 12 09/18/2019 09:13 AM    Alk. phosphatase 104 09/18/2019 09:13 AM    Protein, total 7.4 09/18/2019 09:13 AM    Albumin 4.2 09/18/2019 09:13 AM    Globulin 3.2 09/18/2019 09:13 AM    A-G Ratio 1.3 09/18/2019 09:13 AM    ALT (SGPT) 20 09/18/2019 09:13 AM         Assessment/Plan & Orders:         ICD-10-CM ICD-9-CM    1. Preop general physical exam Z01.818 V72.83 HGB & HCT      METABOLIC PANEL, COMPREHENSIVE      AMB POC EKG ROUTINE W/ 12 LEADS, INTER & REP   2. Other dental procedure status Z98.818 V45.84 HGB & HCT      METABOLIC PANEL, COMPREHENSIVE      AMB POC EKG ROUTINE W/ 12 LEADS, INTER & REP   3. Osteopenia, unspecified location M85.80 733.90    4. Cerumen debris on tympanic membrane of both ears H61.23 380.4    5. Ovarian cancer, left (HCC) C56.2 183.0        IMPRESSION:   Low risk for planned surgery  No contraindications to planned surgery    Patient verbalized understanding and agreement with the plan. Patient was given an after-visit summary.       JOHN Rodriguez  9/18/2019,  8:54 AM

## 2019-09-19 LAB — HBA1C MFR BLD: 5.6 % (ref 4.2–5.6)

## 2019-10-30 ENCOUNTER — OFFICE VISIT (OUTPATIENT)
Dept: FAMILY MEDICINE CLINIC | Age: 59
End: 2019-10-30

## 2019-10-30 VITALS
WEIGHT: 153.6 LBS | BODY MASS INDEX: 24.68 KG/M2 | DIASTOLIC BLOOD PRESSURE: 86 MMHG | HEIGHT: 66 IN | SYSTOLIC BLOOD PRESSURE: 134 MMHG | OXYGEN SATURATION: 97 % | TEMPERATURE: 97.6 F | RESPIRATION RATE: 17 BRPM | HEART RATE: 84 BPM

## 2019-10-30 DIAGNOSIS — R21 RASH OF BODY: Primary | ICD-10-CM

## 2019-10-30 RX ORDER — CLOBETASOL PROPIONATE 0.5 MG/G
OINTMENT TOPICAL 2 TIMES DAILY
Qty: 15 G | Refills: 0 | Status: SHIPPED | OUTPATIENT
Start: 2019-10-30 | End: 2020-09-21

## 2019-10-30 RX ORDER — PREDNISONE 10 MG/1
TABLET ORAL
Qty: 21 TAB | Refills: 0 | Status: SHIPPED | OUTPATIENT
Start: 2019-10-30 | End: 2020-09-21

## 2019-10-30 NOTE — PROGRESS NOTES
Kami Dubose is a 61 y.o. female here for rash on side of face and side of body x1 week. Itching. Using hydrocotozone cream but not helping.  With family

## 2019-10-30 NOTE — PATIENT INSTRUCTIONS
Rash: Care Instructions  Your Care Instructions  A rash is any irritation or inflammation of the skin. Rashes have many possible causes, including allergy, infection, illness, heat, and emotional stress. Follow-up care is a key part of your treatment and safety. Be sure to make and go to all appointments, and call your doctor if you are having problems. It's also a good idea to know your test results and keep a list of the medicines you take. How can you care for yourself at home? · Wash the area with water only. Soap can make dryness and itching worse. Pat dry. · Put cold, wet cloths on the rash to reduce itching. · Keep cool, and stay out of the sun. · Leave the rash open to the air as much of the time as possible. · Sometimes petroleum jelly (Vaseline) can help relieve the discomfort caused by a rash. A moisturizing lotion, such as Cetaphil, also may help. Calamine lotion may help for rashes caused by contact with something (such as a plant or soap) that irritated the skin. Use it 3 or 4 times a day. · If your doctor prescribed a cream, use it as directed. If your doctor prescribed medicine, take it exactly as directed. · If your rash itches so badly that it interferes with your normal activities, take an over-the-counter antihistamine, such as diphenhydramine (Benadryl) or loratadine (Claritin). Read and follow all instructions on the label. When should you call for help? Call your doctor now or seek immediate medical care if:    · You have signs of infection, such as:  ? Increased pain, swelling, warmth, or redness. ? Red streaks leading from the area. ? Pus draining from the area. ? A fever.     · You have joint pain along with the rash.    Watch closely for changes in your health, and be sure to contact your doctor if:    · Your rash is changing or getting worse.  For example, call if you have pain along with the rash, the rash is spreading, or you have new blisters.     · You do not get better after 1 week. Where can you learn more? Go to http://jose-diana.info/. Enter M771 in the search box to learn more about \"Rash: Care Instructions. \"  Current as of: April 1, 2019  Content Version: 12.2  © 6367-8070 [employer]. Care instructions adapted under license by [employer] (which disclaims liability or warranty for this information). If you have questions about a medical condition or this instruction, always ask your healthcare professional. [address] any warranty or liability for your use of this information.

## 2019-11-02 NOTE — PROGRESS NOTES
Patient: Dionne Darnell  Date of Service: 10/31/2019   Provider: JOHN Thorne     REASON FOR VISIT:   Chief Complaint   Patient presents with    Rash        HISTORY OF PRESENT ILLNESS:   Dionne Darnell is a 61 y.o. female who presents to the office for acute care. She is here with her brother who is her POA and another family member. They have brought her here to evaluate rash on her body that started about 2 weeks ago. Started on the left lower abdomen and pelvic area and less than a week ago they noticed a rash on her right forehead. Describes rash as itching. They have been applying over-the-counter cortisone with minimal relief. Denies drainage or tenderness. 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 15 g 0    predniSONE (STERAPRED DS) 10 mg dose pack See administration instruction per 10mg dose pack 21 Tab 0    metoclopramide HCl (REGLAN) 10 mg tablet Take 10 mg by mouth Before breakfast, lunch, dinner and at bedtime.  LORazepam (ATIVAN) 1 mg tablet Take  by mouth every four (4) hours as needed for Anxiety. ROS:   Pertinent positive as above in HPI. All others negative. PHYSICAL EXAMINATION:  Visit Vitals  /86   Pulse 84   Temp 97.6 °F (36.4 °C) (Oral)   Resp 17   Ht 5' 6\" (1.676 m)   Wt 153 lb 9.6 oz (69.7 kg)   SpO2 97%   BMI 24.79 kg/m²      Body mass index is 24.79 kg/m². Appearance: alert, well appearing, and in no distress. ENT normal.    Neck supple. No adenopathy or thyromegaly.    Lungs: Normal breathing   Cardiovascular: Normal heart rate  Skin: About 2 quarter size raised red lesions on the left lower abdomen/pelvic area without swelling, tenderness or drainage. Mildly raised rash right forearm without tenderness redness swelling or drainage. Neurological is normal, no focal findings. ASSESSMENT/PLAN:  Diagnoses and all orders for this visit:    1. Rash of body  -     clobetasol (TEMOVATE) 0.05 % ointment; Apply  to affected area two (2) times a day. -     predniSONE (STERAPRED DS) 10 mg dose pack; See administration instruction per 10mg dose pack  -     Advised to use a thin layer of the weaker strength cortisone on the right frontal lesion and the use the prescribed dose as directed. Patient advised to return to clinic if symptoms persist or to ED if they become worse  Patient verbalized understanding to above instructions. Follow-up and Dispositions    · Return if symptoms worsen or fail to improve.           JOHN Hoover   11/1/2019   8:51 PM

## 2020-09-23 PROBLEM — K92.1 HEMATOCHEZIA: Status: ACTIVE | Noted: 2020-09-23

## 2021-06-22 PROBLEM — I82.409 DVT (DEEP VENOUS THROMBOSIS) (HCC): Status: ACTIVE | Noted: 2021-06-22

## 2021-06-22 PROBLEM — C56.9 OVARIAN CANCER (HCC): Status: ACTIVE | Noted: 2021-06-22

## 2021-06-22 PROBLEM — N39.0 UTI (URINARY TRACT INFECTION): Status: ACTIVE | Noted: 2021-06-22

## 2022-03-18 PROBLEM — E87.20 LACTIC ACIDOSIS: Status: ACTIVE | Noted: 2018-03-05

## 2022-03-18 PROBLEM — N39.0 UTI (URINARY TRACT INFECTION): Status: ACTIVE | Noted: 2021-06-22

## 2022-03-18 PROBLEM — I82.409 DVT (DEEP VENOUS THROMBOSIS) (HCC): Status: ACTIVE | Noted: 2021-06-22

## 2022-03-18 PROBLEM — C56.2 OVARIAN CANCER, LEFT (HCC): Status: ACTIVE | Noted: 2018-03-09

## 2022-03-19 PROBLEM — C56.9 OVARIAN CANCER (HCC): Status: ACTIVE | Noted: 2021-06-22

## 2022-03-19 PROBLEM — C80.1 CANCER (HCC): Status: ACTIVE | Noted: 2018-01-01

## 2022-03-19 PROBLEM — K63.89 MESENTERIC MASS: Status: ACTIVE | Noted: 2018-03-05

## 2022-03-20 PROBLEM — R18.8 ASCITES: Status: ACTIVE | Noted: 2018-03-05

## 2022-03-20 PROBLEM — D72.829 LEUKOCYTOSIS: Status: ACTIVE | Noted: 2018-03-05

## 2022-03-20 PROBLEM — K92.1 HEMATOCHEZIA: Status: ACTIVE | Noted: 2020-09-23

## 2023-01-31 RX ORDER — LORAZEPAM 1 MG/1
1 TABLET ORAL EVERY 6 HOURS PRN
COMMUNITY
Start: 2021-07-04